# Patient Record
Sex: MALE | Race: WHITE
[De-identification: names, ages, dates, MRNs, and addresses within clinical notes are randomized per-mention and may not be internally consistent; named-entity substitution may affect disease eponyms.]

---

## 2017-06-16 ENCOUNTER — HOSPITAL ENCOUNTER (OUTPATIENT)
Dept: HOSPITAL 62 - LAB | Age: 77
End: 2017-06-16
Attending: INTERNAL MEDICINE
Payer: MEDICARE

## 2017-06-16 DIAGNOSIS — I12.9: Primary | ICD-10-CM

## 2017-06-16 DIAGNOSIS — N18.3: ICD-10-CM

## 2017-06-16 LAB
ANION GAP SERPL CALC-SCNC: 12 MMOL/L (ref 5–19)
BUN SERPL-MCNC: 23 MG/DL (ref 7–20)
CALCIUM: 9.7 MG/DL (ref 8.4–10.2)
CHLORIDE SERPL-SCNC: 107 MMOL/L (ref 98–107)
CO2 SERPL-SCNC: 23 MMOL/L (ref 22–30)
CREAT SERPL-MCNC: 1.38 MG/DL (ref 0.52–1.25)
GLUCOSE SERPL-MCNC: 103 MG/DL (ref 75–110)
POTASSIUM SERPL-SCNC: 4.3 MMOL/L (ref 3.6–5)
SODIUM SERPL-SCNC: 141.6 MMOL/L (ref 137–145)

## 2017-06-16 PROCEDURE — 80048 BASIC METABOLIC PNL TOTAL CA: CPT

## 2017-06-16 PROCEDURE — 36415 COLL VENOUS BLD VENIPUNCTURE: CPT

## 2017-09-07 ENCOUNTER — HOSPITAL ENCOUNTER (OUTPATIENT)
Dept: HOSPITAL 62 - LAB | Age: 77
End: 2017-09-07
Attending: SPECIALIST
Payer: MEDICARE

## 2017-09-07 DIAGNOSIS — R10.9: Primary | ICD-10-CM

## 2017-09-07 DIAGNOSIS — D50.9: ICD-10-CM

## 2017-09-07 LAB
BASOPHILS # BLD AUTO: 0.1 10^3/UL (ref 0–0.2)
BASOPHILS NFR BLD AUTO: 1.2 % (ref 0–2)
EOSINOPHIL # BLD AUTO: 0.4 10^3/UL (ref 0–0.6)
EOSINOPHIL NFR BLD AUTO: 5.9 % (ref 0–6)
ERYTHROCYTE [DISTWIDTH] IN BLOOD BY AUTOMATED COUNT: 12.9 % (ref 11.5–14)
FERRITIN SERPL-MCNC: 142 NG/ML (ref 17.9–464)
HCT VFR BLD CALC: 47.7 % (ref 37.9–51)
HGB BLD-MCNC: 15.9 G/DL (ref 13.5–17)
HGB HCT DIFFERENCE: 0
IRON SERPL-MCNC: 93.2 UG/DL (ref 49–181)
LYMPHOCYTES # BLD AUTO: 1.8 10^3/UL (ref 0.5–4.7)
LYMPHOCYTES NFR BLD AUTO: 24.6 % (ref 13–45)
MCH RBC QN AUTO: 32.5 PG (ref 27–33.4)
MCHC RBC AUTO-ENTMCNC: 33.3 G/DL (ref 32–36)
MCV RBC AUTO: 98 FL (ref 80–97)
MONOCYTES # BLD AUTO: 0.9 10^3/UL (ref 0.1–1.4)
MONOCYTES NFR BLD AUTO: 12.5 % (ref 3–13)
NEUTROPHILS # BLD AUTO: 4.1 10^3/UL (ref 1.7–8.2)
NEUTS SEG NFR BLD AUTO: 55.8 % (ref 42–78)
RBC # BLD AUTO: 4.89 10^6/UL (ref 4.35–5.55)
WBC # BLD AUTO: 7.4 10^3/UL (ref 4–10.5)

## 2017-09-07 PROCEDURE — 83540 ASSAY OF IRON: CPT

## 2017-09-07 PROCEDURE — 82728 ASSAY OF FERRITIN: CPT

## 2017-09-07 PROCEDURE — 36415 COLL VENOUS BLD VENIPUNCTURE: CPT

## 2017-09-07 PROCEDURE — 85025 COMPLETE CBC W/AUTO DIFF WBC: CPT

## 2017-09-21 NOTE — HISTORY AND PHYSICAL E
History and Physical



NAME: CORAL ABAD

MRN:  [id number]             : 1940   AGE: 77Y

ADMITTED: 2017                    ROOM:

 



CHIEF COMPLAINT:

Abdominal pain.



EXAM:

Upper endoscopy.



HISTORY OF PRESENT ILLNESS:

The patient does have history of colorectal polyps.  At this time he

presented with abdominal pain.  Patient for upper endoscopy.



I saw him .  His colon showed diverticulosis.



SOCIAL HISTORY:

He used to smoke, drinks socially, , 2 children.



ALLERGIES:

Negative.



REVIEW OF SYSTEMS:

RESPIRATORY:  COPD.



CARDIAC:  No chest pain.



ENDOCRINE:  Negative.



PHYSICAL EXAMINATION:



VITAL SIGNS:  Blood pressure 120/70, pulse 80, respirations 18,

temperature is 98.



HEAD, EYES, EARS, NOSE AND THROAT:  Normal.



ABDOMEN:  Soft.



NEUROLOGIC EXAM:  Negative.



PAST MEDICAL HISTORY:

The patient is known to have coronary artery disease.  He is on Plavix and

he takes baby aspirin.  Scope  shows descending colon polyp, ectasia

in the cecum.  The patient does have ectasia in the cecum.  The patient

was done .  Another colonoscopy done in the year .  Again,

diverticulosis, question AV malformation in the cecum, COPD, sleep apnea.



CONCLUSION:

Abdominal pain.



PLAN:

Upper scope scheduled for .  Stop Plavix and aspirin 3 days prior to

endoscopy regarding abdominal pain.







DICTATING PHYSICIAN: FADUMO KESSLER M.D.





[id number]                  DT: 2017    1511

PHY#: [id number]            DD: 2017    1436

ID:   [id number]           JOB#: [id number]       ACCT: [id number]



cc:FADUMO KESSLER M.D.

>

## 2017-09-27 ENCOUNTER — HOSPITAL ENCOUNTER (OUTPATIENT)
Dept: HOSPITAL 62 - END | Age: 77
Discharge: HOME | End: 2017-09-27
Attending: SPECIALIST
Payer: MEDICARE

## 2017-09-27 VITALS — SYSTOLIC BLOOD PRESSURE: 105 MMHG | DIASTOLIC BLOOD PRESSURE: 59 MMHG

## 2017-09-27 DIAGNOSIS — K20.9: ICD-10-CM

## 2017-09-27 DIAGNOSIS — Z79.02: ICD-10-CM

## 2017-09-27 DIAGNOSIS — J44.9: ICD-10-CM

## 2017-09-27 DIAGNOSIS — R10.9: Primary | ICD-10-CM

## 2017-09-27 DIAGNOSIS — K29.70: ICD-10-CM

## 2017-09-27 DIAGNOSIS — I25.10: ICD-10-CM

## 2017-09-27 DIAGNOSIS — Z79.82: ICD-10-CM

## 2017-09-27 DIAGNOSIS — K29.80: ICD-10-CM

## 2017-09-27 PROCEDURE — 43235 EGD DIAGNOSTIC BRUSH WASH: CPT

## 2017-09-27 PROCEDURE — 0DJD8ZZ INSPECTION OF LOWER INTESTINAL TRACT, VIA NATURAL OR ARTIFICIAL OPENING ENDOSCOPIC: ICD-10-PCS | Performed by: SPECIALIST

## 2017-09-27 RX ADMIN — MIDAZOLAM HYDROCHLORIDE ONE MG: 1 INJECTION, SOLUTION INTRAMUSCULAR; INTRAVENOUS at 09:10

## 2017-09-27 RX ADMIN — MIDAZOLAM HYDROCHLORIDE ONE MG: 1 INJECTION, SOLUTION INTRAMUSCULAR; INTRAVENOUS at 09:07

## 2017-09-27 NOTE — DISCHARGE SUMMARY E
Discharge Summary



NAME: CORAL ABAD

MRN:  [id number]        : 1940     AGE: 77Y

ADMITTED: 2017                 DISCHARGED: 2017



PROCEDURE:

EGD.



HISTORY:

This 77-year-old patient presented with dysphagia.  Patient is a cardiac

patient.  He has stents.  He takes Plavix and aspirin.  Today's upper

scope shows no definitive stricture, question lower esophageal ring,

question early narrowing, some erosions at the GE junction.  Because of

the cardiac risk, I did not feel the urge to dilate today.  I am going to

obtain a barium swallow and discharge him on a soft, baby food,

nonchewable diet.  If the barium swallow shows definite stricture I am

going to stop his Plavix for 5 days and aspirin 5 days.  There was some

friability at the junction just passing the scope.  For future endoscopy,

we will stop his Plavix for 5 days and aspirin 5 days and consider

dilatation if the barium swallow shows a stricture.





DICTATING PHYSICIAN:  FADUMO KESSLER M.D.





1209M                  DT: 2017    0936

Y#: [id number]            DD: [id number]

ID:   [id number]           JOB#: [id number]       ACCT: [id number]



cc:FADUMO KESSLER M.D.

>

## 2017-09-27 NOTE — OPERATIVE REPORT E
Operative Report



NAME: CORAL ABAD

MRN:  [id number]          : 1940 AGE:  77Y

DATE OF SURGERY: 2017               ROOM:



PREOPERATIVE DIAGNOSIS:

Dysphagia.



POSTOPERATIVE DIAGNOSES:

1.  Esophagitis, mild.

2.  Possibility of early stricture.

3.  Possibility of lower esophageal ring.

4.  Mild gastritis.

5.  Mild duodenitis.



PROCEDURE:

1.  Esophagoscopy.

2.  Gastroscopy.

3.  Duodenoscopy.



SURGEON:

FADUMO KESSLER M.D.



ANESTHESIA:

Versed 3 and fentanyl 50.



TISSUE REMOVED OR ALTERED:

None.



PROCEDURE:

Baby scope passed under guided vision.  No difficulties.  Esophagoscopy

junction at 40.  There is possibility of early lower esophageal ring. 

Possibility of early stricture.  Gastroscopy:  Mild gastritis. 

Duodenoscopy:  Mild duodenitis.



Patient is a cardiac patient and a stent.  We stopped his Plavix and

aspirin for 3 days.  I did not feel that the stricture is severe or

definite, so I am going to hold on dilatation today.  I am going to do

barium swallow and see if the stricture is definite.  We will bring him

back after we stop his Plavix 5 days and aspirin.  Consider dilatation at

that time if the stricture is definite on barium swallow.  By endoscopy,

the scope goes in and out with no difficulties and it may be early

stricture.  There was some erosions at the GE junction.  Patient to be

started on Dexilant 60 daily.  Patient to be discharged on soft,

non-chewable diet pending barium enema.



CONCLUSION:

1.  Question early esophageal stricture.

2.  Question lower esophageal ring.

3.  Mild esophagitis.

4.  Mild gastritis.

5.  Mild duodenitis.









DICTATING PHYSICIAN:  FADUMO KESSLER M.D.





[id number]                  DT: 201745

PHY#: [id number]            DD: 2017

ID:   [id number]           JOB#: [id number]       ACCT: [id number]



cc:FADUMO KESSLER M.D.

>

## 2017-09-28 ENCOUNTER — HOSPITAL ENCOUNTER (EMERGENCY)
Dept: HOSPITAL 62 - ER | Age: 77
Discharge: HOME | End: 2017-09-28
Payer: MEDICARE

## 2017-09-28 VITALS — DIASTOLIC BLOOD PRESSURE: 79 MMHG | SYSTOLIC BLOOD PRESSURE: 131 MMHG

## 2017-09-28 DIAGNOSIS — R11.2: ICD-10-CM

## 2017-09-28 DIAGNOSIS — R10.13: Primary | ICD-10-CM

## 2017-09-28 DIAGNOSIS — I25.2: ICD-10-CM

## 2017-09-28 DIAGNOSIS — Z87.891: ICD-10-CM

## 2017-09-28 DIAGNOSIS — I25.10: ICD-10-CM

## 2017-09-28 DIAGNOSIS — R19.7: ICD-10-CM

## 2017-09-28 LAB
ALBUMIN SERPL-MCNC: 4.4 G/DL (ref 3.5–5)
ALP SERPL-CCNC: 82 U/L (ref 38–126)
ALT SERPL-CCNC: 20 U/L (ref 21–72)
ANION GAP SERPL CALC-SCNC: 13 MMOL/L (ref 5–19)
APPEARANCE UR: CLEAR
AST SERPL-CCNC: 15 U/L (ref 17–59)
BASE EXCESS BLDV CALC-SCNC: -2.3 MMOL/L
BASOPHILS # BLD AUTO: 0 10^3/UL (ref 0–0.2)
BASOPHILS NFR BLD AUTO: 0.2 % (ref 0–2)
BILIRUB DIRECT SERPL-MCNC: 0.4 MG/DL (ref 0–0.4)
BILIRUB SERPL-MCNC: 0.7 MG/DL (ref 0.2–1.3)
BILIRUB UR QL STRIP: NEGATIVE
BUN SERPL-MCNC: 25 MG/DL (ref 7–20)
CALCIUM: 10.3 MG/DL (ref 8.4–10.2)
CHLORIDE SERPL-SCNC: 104 MMOL/L (ref 98–107)
CO2 SERPL-SCNC: 24 MMOL/L (ref 22–30)
CREAT SERPL-MCNC: 1.62 MG/DL (ref 0.52–1.25)
EOSINOPHIL # BLD AUTO: 0.1 10^3/UL (ref 0–0.6)
EOSINOPHIL NFR BLD AUTO: 0.4 % (ref 0–6)
ERYTHROCYTE [DISTWIDTH] IN BLOOD BY AUTOMATED COUNT: 13.1 % (ref 11.5–14)
GLUCOSE SERPL-MCNC: 146 MG/DL (ref 75–110)
GLUCOSE UR STRIP-MCNC: NEGATIVE MG/DL
HCO3 BLDV-SCNC: 22.7 MMOL/L (ref 20–32)
HCT VFR BLD CALC: 49.8 % (ref 37.9–51)
HGB BLD-MCNC: 17 G/DL (ref 13.5–17)
HGB HCT DIFFERENCE: 1.2
KETONES UR STRIP-MCNC: NEGATIVE MG/DL
LYMPHOCYTES # BLD AUTO: 0.9 10^3/UL (ref 0.5–4.7)
LYMPHOCYTES NFR BLD AUTO: 7.3 % (ref 13–45)
MCH RBC QN AUTO: 32.6 PG (ref 27–33.4)
MCHC RBC AUTO-ENTMCNC: 34.1 G/DL (ref 32–36)
MCV RBC AUTO: 96 FL (ref 80–97)
MONOCYTES # BLD AUTO: 1.2 10^3/UL (ref 0.1–1.4)
MONOCYTES NFR BLD AUTO: 9.7 % (ref 3–13)
NEUTROPHILS # BLD AUTO: 10.5 10^3/UL (ref 1.7–8.2)
NEUTS SEG NFR BLD AUTO: 82.4 % (ref 42–78)
NITRITE UR QL STRIP: NEGATIVE
PCO2 BLDV: 40 MMHG (ref 35–63)
PH BLDV: 7.37 [PH] (ref 7.3–7.42)
PH UR STRIP: 5 [PH] (ref 5–9)
POTASSIUM SERPL-SCNC: 4.7 MMOL/L (ref 3.6–5)
PROT SERPL-MCNC: 8 G/DL (ref 6.3–8.2)
PROT UR STRIP-MCNC: NEGATIVE MG/DL
PROTHROMBIN TIME: 13.4 SEC (ref 11.4–15.4)
RBC # BLD AUTO: 5.22 10^6/UL (ref 4.35–5.55)
SODIUM SERPL-SCNC: 141.4 MMOL/L (ref 137–145)
SP GR UR STRIP: 1.02
UROBILINOGEN UR-MCNC: NEGATIVE MG/DL (ref ?–2)
WBC # BLD AUTO: 12.8 10^3/UL (ref 4–10.5)

## 2017-09-28 PROCEDURE — 81001 URINALYSIS AUTO W/SCOPE: CPT

## 2017-09-28 PROCEDURE — 85025 COMPLETE CBC W/AUTO DIFF WBC: CPT

## 2017-09-28 PROCEDURE — 87040 BLOOD CULTURE FOR BACTERIA: CPT

## 2017-09-28 PROCEDURE — 82803 BLOOD GASES ANY COMBINATION: CPT

## 2017-09-28 PROCEDURE — 93010 ELECTROCARDIOGRAM REPORT: CPT

## 2017-09-28 PROCEDURE — 85610 PROTHROMBIN TIME: CPT

## 2017-09-28 PROCEDURE — 99284 EMERGENCY DEPT VISIT MOD MDM: CPT

## 2017-09-28 PROCEDURE — 74220 X-RAY XM ESOPHAGUS 1CNTRST: CPT

## 2017-09-28 PROCEDURE — 93005 ELECTROCARDIOGRAM TRACING: CPT

## 2017-09-28 PROCEDURE — 82962 GLUCOSE BLOOD TEST: CPT

## 2017-09-28 PROCEDURE — 87086 URINE CULTURE/COLONY COUNT: CPT

## 2017-09-28 PROCEDURE — 71020: CPT

## 2017-09-28 PROCEDURE — 83605 ASSAY OF LACTIC ACID: CPT

## 2017-09-28 PROCEDURE — 80053 COMPREHEN METABOLIC PANEL: CPT

## 2017-09-28 PROCEDURE — 36415 COLL VENOUS BLD VENIPUNCTURE: CPT

## 2017-09-28 NOTE — RADIOLOGY REPORT (SQ)
EXAM DESCRIPTION:  BARIUM SWALLOW ESOPHAGUS



COMPLETED DATE/TIME:  9/28/2017 10:05 am



REASON FOR STUDY:  post egd pain



COMPARISON:   None.



TECHNIQUE:  Under fluoroscopic guidance, patient ingested water-soluble contrast. Fluoroscopic spot i
mages and routine radiographic images acquired and stored on PACS.

12 MM BARIUM TABLET GIVEN: No



LIMITATIONS:  None.



FLUOROSCOPY TIME:  3 minutes

16 images saved to PACS.



FINDINGS:  NEUROMUSCULAR COORDINATION OF SWALLOW: Normal. No aspiration.

ESOPHAGEAL MOTILITY: Normal peristalsis. No esophageal spasm.

ESOPHAGEAL MUCOSA: Normal mucosa without masses or ulceration.

GASTRO-ESOPHAGEAL JUNCTION: No hiatal hernia or reflux.

NON-GI TRACT STRUCTURES: No significant finding.

OTHER: Note is made of pleural plaque at the bases of each lung.



IMPRESSION:  NORMAL SINGLE CONTRAST SWALLOW.  NO EVIDENCE FOR CONTRAST LEAK.



COMMENT:  None

Quality :  Final reports for procedures using fluoroscopy that document radiation exposure yissel
jacqueline, or exposure time and number of fluorographic images (if radiation exposure indices are not avail
able)



TECHNICAL DOCUMENTATION:  JOB ID:  [id number]

 2011 [employer]- All Rights Reserved

## 2017-09-28 NOTE — ER DOCUMENT REPORT
ED General





- General


Chief Complaint: Abdominal Pain


Stated Complaint: ABDOMINAL PAIN,VOMITING,DIARRHEA


Time Seen by Provider: 09/28/17 08:03


Mode of Arrival: Ambulatory


Information source: Patient


Notes: 





77-year-old male presents with complaints of epigastric abdominal pain after an 

EGD was performed.  Patient denies any fevers or chills admits to nausea 

vomiting passing gas


TRAVEL OUTSIDE OF THE [place] IN LAST 30 DAYS: No





- HPI


Onset: Just prior to arrival


Onset/Duration: Sudden


Quality of pain: Achy


Severity: Mild


Pain Level: 1


Associated symptoms: Nausea, Vomiting


Exacerbated by: Denies


Relieved by: Denies


Similar symptoms previously: Yes


Recently seen / treated by doctor: Yes





- Related Data


Allergies/Adverse Reactions: 


 





No Known Allergies Allergy (Verified 09/28/17 07:56)


 








Home Medications: 


 Current Home Medications





Atorvastatin Calcium [Lipitor 40 mg Tablet] 40 mg PO QHS 09/28/17 [History]











Past Medical History





- Social History


Smoking Status: Former Smoker


Cigarette use (# per day): No


Chew tobacco use (# tins/day): No


Smoking Education Provided: No


Frequency of alcohol use: None


Drug Abuse: None


Family History: Reviewed & Not Pertinent





- Past Medical History


Cardiac Medical History: Reports: Hx Coronary Artery Disease - CARDIAC STENT X 2

/ RAPID HEART RATE, Hx Heart Attack - 8/16  2 STENTS PLACED, Hx 

Hypercholesterolemia, Hx Hypertension


Pulmonary Medical History: Reports: Hx COPD


   Denies: Hx Asthma, Hx Bronchitis, Hx Pneumonia


Neurological Medical History: Denies: Hx Cerebrovascular Accident, Hx Seizures


Renal/ Medical History: Denies: Hx Peritoneal Dialysis


GI Medical History: Denies: Hx Hepatitis, Hx Hiatal Hernia, Hx Ulcer


Musculoskeltal Medical History: Reports Hx Arthritis - generalized


Infectious Medical History: Denies: Hx Hepatitis


Past Surgical History: Reports: Hx Abdominal Surgery.  Denies: Hx Open Heart 

Surgery, Hx Pacemaker





- Immunizations


Hx Diphtheria, Pertussis, Tetanus Vaccination: No


Hx Pneumococcal Vaccination: 11/01/12





Review of Systems





- Review of Systems


Notes: 





REVIEW OF SYSTEMS:


CONSTITUTIONAL :  Denies fever,  chills, or sweats.  Denies recent illness.


EENT:   Denies eye, ear, throat, or mouth pain or symptoms.  Denies nasal or 

sinus congestion or discharge.  Denies throat, tongue, or mouth swelling or 

difficulty swallowing.


CARDIOVASCULAR:  Denies chest pain.  Denies palpitations or racing or irregular 

heart beat.  Denies ankle edema.


RESPIRATORY:  Denies cough, cold, or chest congestion.  Denies shortness of 

breath, difficulty breathing, or wheezing.


GASTROINTESTINAL: Admits to abdominal pain nausea vomiting


GENITOURINARY:  Denies difficulty urinating, painful urination, burning, 

frequency, blood in urine, or discharge.


MUSCULOSKELETAL:  Denies back or neck pain or stiffness.  Denies joint pain or 

swelling.


SKIN:   Denies rash, lesions or sores.


HEMATOLOGIC :   Denies easy bruising or bleeding.


LYMPHATIC:  Denies swollen, enlarged glands.


NEUROLOGICAL:  Denies confusion or altered mental status.  Denies passing out 

or loss of consciousness.  Denies dizziness or lightheadedness.  Denies 

headache.  Denies weakness or paralysis or loss of use of either side.  Denies 

problems with gait or speech.  Denies sensory loss, numbness, or tingling.  

Denies seizures.


PSYCHIATRIC:  Denies anxiety or stress.  Denies depression, suicidal ideation, 

or homicidal ideation.





ALL OTHER SYSTEMS REVIEWED AND NEGATIVE.





Dictation was performed using Dragon voice recognition software 





PHYSICAL EXAMINATION:





GENERAL: Well-appearing, well-nourished and in no acute distress.





HEAD: Atraumatic, normocephalic.





EYES: Pupils equal round and reactive to light, extraocular movements intact, 

sclera anicteric, conjunctiva are normal.





ENT: Nares patent, oropharynx clear without exudates.  Moist mucous membranes.





NECK: Normal range of motion, supple without lymphadenopathy





LUNGS: Breath sounds clear to auscultation bilaterally and equal.  No wheezes 

rales or rhonchi.





HEART: Regular rate and rhythm without murmurs





ABDOMEN: Soft, minimally tender in the epigastric region





Musculoskeletal: Normal range of motion, no pitting or edema.  No cyanosis.





NEUROLOGICAL: Cranial nerves grossly intact.  Normal speech, normal gait.  

Normal sensory, motor exams 





PSYCH: Normal mood, normal affect.





SKIN: Warm, Dry, normal turgor, no rashes or lesions noted.





Physical Exam





- Vital signs


Vitals: 


 











Temp Pulse Resp BP Pulse Ox


 


 97.7 F   119 H  20   127/68 H  94 


 


 09/28/17 07:56  09/28/17 07:56  09/28/17 07:56  09/28/17 07:56  09/28/17 07:56














Course





- Re-evaluation


Re-evalutation: 





09/28/17 15:43


Concern was for an esophageal rupture secondary to the EGD, patient's x-ray was 

negative a barium swallow was performed which noted no leakage of contrast.  

Patient's GI specialist Dr. Cole presented to the ED and evaluated the patient 

as well and was appreciative of the care





Patient was discharged home with very close follow-up he is very happy with 

this plan








After performing a Medical Screening Examination, I estimate there is LOW risk 

for ACUTE APPENDICITIS, BOWEL OBSTRUCTION, ACUTE CHOLECYSTITIS, PERFORATED 

DIVERTICULITIS, INCARCERATED HERNIA, PANCREATITIS, or PERFORATED ULCER, thus I 

consider the discharge disposition reasonable. Also, there is no evidence or 

peritonitis, sepsis, or toxicity.  I have reevaluated this patient multiple 

times and no significant life threatening changes are noted. The patient and I 

have discussed the diagnosis and risks, and we agree with discharging home with 

close follow-up with the understanding that symptoms and presentations can 

change. We also discussed returning to the Emergency Department immediately if 

new or worsening symptoms occur. We have discussed the symptoms which are most 

concerning (e.g., bloody stool, fever, changing or worsening pain, intractable 

vomiting - standard verbal up date) that necessitate immediate return.





- Vital Signs


Vital signs: 


 











Temp Pulse Resp BP Pulse Ox


 


 98.4 F   92   20   131/79 H  95 


 


 09/28/17 11:42  09/28/17 11:42  09/28/17 07:56  09/28/17 11:42  09/28/17 11:42














- Laboratory


Result Diagrams: 


 09/28/17 08:25





 09/28/17 08:25


Laboratory results interpreted by me: 


 











  09/28/17 09/28/17 09/28/17





  08:25 08:25 08:25


 


WBC  12.8 H  


 


Seg Neutrophils %  82.4 H  


 


Lymphocytes %  7.3 L  


 


Absolute Neutrophils  10.5 H  


 


BUN   25 H 


 


Creatinine   1.62 H 


 


Est GFR ( Amer)   50 L 


 


Est GFR (Non-Af Amer)   41 L 


 


Glucose   146 H 


 


POC Glucose   


 


Lactic Acid    2.4 H


 


Calcium   10.3 H 


 


AST   15 L 


 


ALT   20 L 














  09/28/17





  08:33


 


WBC 


 


Seg Neutrophils % 


 


Lymphocytes % 


 


Absolute Neutrophils 


 


BUN 


 


Creatinine 


 


Est GFR ( Amer) 


 


Est GFR (Non-Af Amer) 


 


Glucose 


 


POC Glucose  132 H


 


Lactic Acid 


 


Calcium 


 


AST 


 


ALT 














- Diagnostic Test


Radiology reviewed: Image reviewed, Reports reviewed - No acute abnormality





Discharge





- Discharge


Clinical Impression: 


Abdominal pain


Qualifiers:


 Abdominal location: epigastric Qualified Code(s): R10.13 - Epigastric pain





Nausea & vomiting


Qualifiers:


 Vomiting type: unspecified Vomiting Intractability: non-intractable Qualified 

Code(s): R11.2 - Nausea with vomiting, unspecified





Condition: Stable


Disposition: HOME, SELF-CARE


Instructions:  Abdominal Pain ([facility])


Prescriptions: 


Ondansetron HCl [Zofran 8 mg Tablet] 8 mg PO Q8HP PRN #30 tablet


 PRN Reason: 


Famotidine [Pepcid 20 mg Tablet] 20 mg PO DAILY #30 tablet


Referrals: 


RAVEN PATEL MD [Primary Care Provider] - Follow up as needed


FADUMO COLE MD [EMERITUS] - Follow up tomorrow

## 2017-09-28 NOTE — EKG REPORT
SEVERITY:- ABNORMAL ECG -

SINUS TACHYCARDIA

VENTRICULAR BIGEMINY

BORDERLINE LEFT AXIS DEVIATION

:

Confirmed by: Diann Coreas MD 28-Sep-2017 10:03:00

## 2017-09-28 NOTE — RADIOLOGY REPORT (SQ)
EXAM DESCRIPTION:  CHEST PA/LAT



COMPLETED DATE/TIME:  9/28/2017 8:44 am



REASON FOR STUDY:  chest pain abd pain post egd



COMPARISON:  Chest x-ray dated 8/10/2016 and 9/18/2012.  Chest CT dated 1/16/2013.



EXAM PARAMETERS:  NUMBER OF VIEWS: two views

TECHNIQUE: Digital Frontal and Lateral radiographic views of the chest acquired.

RADIATION DOSE: NA

LIMITATIONS: none



FINDINGS:  LUNGS AND PLEURA: Prominent interstitial densities.  Scattered calcified pleural plaques. 
 Density in the right apex with pleural thickening.  No definite focal infiltrates.  No large pleural
 effusion.  No pneumothorax.

MEDIASTINUM AND HILAR STRUCTURES: No masses or contour abnormalities.

HEART AND VASCULAR STRUCTURES: Heart normal size.  No evidence for failure.

BONES: No acute findings.

HARDWARE: None in the chest.

OTHER: No other significant finding.



IMPRESSION:  STABLE CHRONIC CHANGES INCLUDING PARENCHYMAL SCARRING AND CALCIFIED PLEURAL PLAQUES.  DE
NSITY IN THE RIGHT APEX WITH ADJACENT PLEURAL THICKENING IS PRESENT ON PRIOR STUDIES INCLUDING CHEST 
CT AND THERE HAS BEEN NO CHANGE SINCE 2012.  NO DEFINITE ACUTE FINDINGS.



TECHNICAL DOCUMENTATION:  JOB ID:  [id number]

 [employer]- All Rights Reserved

## 2017-12-11 ENCOUNTER — HOSPITAL ENCOUNTER (OUTPATIENT)
Dept: HOSPITAL 62 - LAB | Age: 77
End: 2017-12-11
Attending: INTERNAL MEDICINE
Payer: MEDICARE

## 2017-12-11 DIAGNOSIS — N18.3: Primary | ICD-10-CM

## 2017-12-11 DIAGNOSIS — I12.9: ICD-10-CM

## 2017-12-11 LAB
ANION GAP SERPL CALC-SCNC: 13 MMOL/L (ref 5–19)
BUN SERPL-MCNC: 23 MG/DL (ref 7–20)
CALCIUM: 9.5 MG/DL (ref 8.4–10.2)
CHLORIDE SERPL-SCNC: 106 MMOL/L (ref 98–107)
CO2 SERPL-SCNC: 26 MMOL/L (ref 22–30)
CREAT SERPL-MCNC: 1.43 MG/DL (ref 0.52–1.25)
ERYTHROCYTE [DISTWIDTH] IN BLOOD BY AUTOMATED COUNT: 13.8 % (ref 11.5–14)
GLUCOSE SERPL-MCNC: 100 MG/DL (ref 75–110)
HCT VFR BLD CALC: 46.3 % (ref 37.9–51)
HGB BLD-MCNC: 15.8 G/DL (ref 13.5–17)
HGB HCT DIFFERENCE: 1.1
MCH RBC QN AUTO: 32.8 PG (ref 27–33.4)
MCHC RBC AUTO-ENTMCNC: 34.2 G/DL (ref 32–36)
MCV RBC AUTO: 96 FL (ref 80–97)
POTASSIUM SERPL-SCNC: 4.6 MMOL/L (ref 3.6–5)
RBC # BLD AUTO: 4.82 10^6/UL (ref 4.35–5.55)
SODIUM SERPL-SCNC: 145.1 MMOL/L (ref 137–145)
WBC # BLD AUTO: 7.4 10^3/UL (ref 4–10.5)

## 2017-12-11 PROCEDURE — 36415 COLL VENOUS BLD VENIPUNCTURE: CPT

## 2017-12-11 PROCEDURE — 80048 BASIC METABOLIC PNL TOTAL CA: CPT

## 2017-12-11 PROCEDURE — 85027 COMPLETE CBC AUTOMATED: CPT

## 2018-06-25 ENCOUNTER — HOSPITAL ENCOUNTER (OUTPATIENT)
Dept: HOSPITAL 62 - LAB | Age: 78
End: 2018-06-25
Attending: INTERNAL MEDICINE
Payer: MEDICARE

## 2018-06-25 DIAGNOSIS — N18.4: ICD-10-CM

## 2018-06-25 DIAGNOSIS — I12.9: Primary | ICD-10-CM

## 2018-06-25 LAB
ANION GAP SERPL CALC-SCNC: 8 MMOL/L (ref 5–19)
BUN SERPL-MCNC: 24 MG/DL (ref 7–20)
CALCIUM: 9.6 MG/DL (ref 8.4–10.2)
CHLORIDE SERPL-SCNC: 108 MMOL/L (ref 98–107)
CO2 SERPL-SCNC: 29 MMOL/L (ref 22–30)
ERYTHROCYTE [DISTWIDTH] IN BLOOD BY AUTOMATED COUNT: 13.5 % (ref 11.5–14)
GLUCOSE SERPL-MCNC: 103 MG/DL (ref 75–110)
HCT VFR BLD CALC: 45.5 % (ref 37.9–51)
HGB BLD-MCNC: 15.5 G/DL (ref 13.5–17)
MCH RBC QN AUTO: 32.7 PG (ref 27–33.4)
MCHC RBC AUTO-ENTMCNC: 34 G/DL (ref 32–36)
MCV RBC AUTO: 96 FL (ref 80–97)
PLATELET # BLD: 212 10^3/UL (ref 150–450)
POTASSIUM SERPL-SCNC: 4.7 MMOL/L (ref 3.6–5)
RBC # BLD AUTO: 4.74 10^6/UL (ref 4.35–5.55)
SODIUM SERPL-SCNC: 145.4 MMOL/L (ref 137–145)
WBC # BLD AUTO: 6.9 10^3/UL (ref 4–10.5)

## 2018-06-25 PROCEDURE — 36415 COLL VENOUS BLD VENIPUNCTURE: CPT

## 2018-06-25 PROCEDURE — 80048 BASIC METABOLIC PNL TOTAL CA: CPT

## 2018-06-25 PROCEDURE — 85027 COMPLETE CBC AUTOMATED: CPT

## 2019-01-04 ENCOUNTER — HOSPITAL ENCOUNTER (OUTPATIENT)
Dept: HOSPITAL 62 - LAB | Age: 79
End: 2019-01-04
Attending: INTERNAL MEDICINE
Payer: MEDICARE

## 2019-01-04 DIAGNOSIS — N18.4: ICD-10-CM

## 2019-01-04 DIAGNOSIS — I12.9: Primary | ICD-10-CM

## 2019-01-04 LAB
ANION GAP SERPL CALC-SCNC: 9 MMOL/L
APPEARANCE UR: CLEAR
APTT PPP: YELLOW S
BILIRUB UR QL STRIP: NEGATIVE
BUN SERPL-MCNC: 22 MG/DL
CALCIUM: 9.9 MG/DL
CHLORIDE SERPL-SCNC: 106 MMOL/L
CO2 SERPL-SCNC: 27 MMOL/L
ERYTHROCYTE [DISTWIDTH] IN BLOOD BY AUTOMATED COUNT: 12.7 %
GLUCOSE SERPL-MCNC: 107 MG/DL
GLUCOSE UR STRIP-MCNC: NEGATIVE MG/DL
HCT VFR BLD CALC: 48.4 %
HGB BLD-MCNC: 16.9 G/DL
KETONES UR STRIP-MCNC: NEGATIVE MG/DL
MCH RBC QN AUTO: 33.2 PG
MCHC RBC AUTO-ENTMCNC: 34.8 G/DL
MCV RBC AUTO: 95 FL
NITRITE UR QL STRIP: NEGATIVE
PH UR STRIP: 5 [PH]
PLATELET # BLD: 204 10^3/UL
POTASSIUM SERPL-SCNC: 4.6 MMOL/L
PROT UR STRIP-MCNC: NEGATIVE MG/DL
RBC # BLD AUTO: 5.08 10^6/UL
SODIUM SERPL-SCNC: 142.2 MMOL/L
SP GR UR STRIP: 1.02
UROBILINOGEN UR-MCNC: NEGATIVE MG/DL
WBC # BLD AUTO: 5.4 10^3/UL

## 2019-01-04 PROCEDURE — 36415 COLL VENOUS BLD VENIPUNCTURE: CPT

## 2019-01-04 PROCEDURE — 80048 BASIC METABOLIC PNL TOTAL CA: CPT

## 2019-01-04 PROCEDURE — 85027 COMPLETE CBC AUTOMATED: CPT

## 2019-01-04 PROCEDURE — 81001 URINALYSIS AUTO W/SCOPE: CPT

## 2019-07-05 ENCOUNTER — HOSPITAL ENCOUNTER (OUTPATIENT)
Dept: HOSPITAL 62 - LAB | Age: 79
End: 2019-07-05
Attending: INTERNAL MEDICINE
Payer: MEDICARE

## 2019-07-05 DIAGNOSIS — I12.9: Primary | ICD-10-CM

## 2019-07-05 DIAGNOSIS — N18.3: ICD-10-CM

## 2019-07-05 LAB
ANION GAP SERPL CALC-SCNC: 7 MMOL/L (ref 5–19)
BUN SERPL-MCNC: 22 MG/DL (ref 7–20)
CALCIUM: 10 MG/DL (ref 8.4–10.2)
CHLORIDE SERPL-SCNC: 104 MMOL/L (ref 98–107)
CO2 SERPL-SCNC: 27 MMOL/L (ref 22–30)
ERYTHROCYTE [DISTWIDTH] IN BLOOD BY AUTOMATED COUNT: 13.6 % (ref 11.5–14)
GLUCOSE SERPL-MCNC: 102 MG/DL (ref 75–110)
HCT VFR BLD CALC: 46.1 % (ref 37.9–51)
HGB BLD-MCNC: 15.7 G/DL (ref 13.5–17)
MCH RBC QN AUTO: 32.4 PG (ref 27–33.4)
MCHC RBC AUTO-ENTMCNC: 34 G/DL (ref 32–36)
MCV RBC AUTO: 95 FL (ref 80–97)
PLATELET # BLD: 202 10^3/UL (ref 150–450)
POTASSIUM SERPL-SCNC: 4.7 MMOL/L (ref 3.6–5)
RBC # BLD AUTO: 4.85 10^6/UL (ref 4.35–5.55)
SODIUM SERPL-SCNC: 137.7 MMOL/L (ref 137–145)
WBC # BLD AUTO: 8.9 10^3/UL (ref 4–10.5)

## 2019-07-05 PROCEDURE — 80048 BASIC METABOLIC PNL TOTAL CA: CPT

## 2019-07-05 PROCEDURE — 85027 COMPLETE CBC AUTOMATED: CPT

## 2019-07-05 PROCEDURE — 36415 COLL VENOUS BLD VENIPUNCTURE: CPT

## 2020-07-16 ENCOUNTER — HOSPITAL ENCOUNTER (EMERGENCY)
Dept: HOSPITAL 62 - ER | Age: 80
Discharge: HOME | End: 2020-07-16
Payer: MEDICARE

## 2020-07-16 VITALS — SYSTOLIC BLOOD PRESSURE: 131 MMHG | DIASTOLIC BLOOD PRESSURE: 114 MMHG

## 2020-07-16 DIAGNOSIS — I25.10: ICD-10-CM

## 2020-07-16 DIAGNOSIS — U07.1: Primary | ICD-10-CM

## 2020-07-16 DIAGNOSIS — E78.00: ICD-10-CM

## 2020-07-16 DIAGNOSIS — Z20.828: ICD-10-CM

## 2020-07-16 DIAGNOSIS — J40: ICD-10-CM

## 2020-07-16 DIAGNOSIS — I10: ICD-10-CM

## 2020-07-16 DIAGNOSIS — R55: ICD-10-CM

## 2020-07-16 LAB
ABSOLUTE LYMPHOCYTES# (MANUAL): 0.1 10^3/UL (ref 0.5–4.7)
ABSOLUTE MONOCYTES # (MANUAL): 1.2 10^3/UL (ref 0.1–1.4)
ADD MANUAL DIFF: YES
ALBUMIN SERPL-MCNC: 3.7 G/DL (ref 3.5–5)
ALP SERPL-CCNC: 72 U/L (ref 38–126)
ANION GAP SERPL CALC-SCNC: 7 MMOL/L (ref 5–19)
APPEARANCE UR: CLEAR
APTT PPP: YELLOW S
AST SERPL-CCNC: 28 U/L (ref 17–59)
BASOPHILS NFR BLD MANUAL: 1 % (ref 0–2)
BILIRUB DIRECT SERPL-MCNC: 0 MG/DL (ref 0–0.4)
BILIRUB SERPL-MCNC: 0.5 MG/DL (ref 0.2–1.3)
BILIRUB UR QL STRIP: NEGATIVE
BUN SERPL-MCNC: 22 MG/DL (ref 7–20)
CALCIUM: 8.9 MG/DL (ref 8.4–10.2)
CHLORIDE SERPL-SCNC: 100 MMOL/L (ref 98–107)
CK SERPL-CCNC: 186 U/L (ref 55–170)
CO2 SERPL-SCNC: 27 MMOL/L (ref 22–30)
EOSINOPHIL NFR BLD MANUAL: 3 % (ref 0–6)
ERYTHROCYTE [DISTWIDTH] IN BLOOD BY AUTOMATED COUNT: 13.5 % (ref 11.5–14)
GLUCOSE SERPL-MCNC: 107 MG/DL (ref 75–110)
GLUCOSE UR STRIP-MCNC: NEGATIVE MG/DL
HCT VFR BLD CALC: 44.5 % (ref 37.9–51)
HGB BLD-MCNC: 15.3 G/DL (ref 13.5–17)
KETONES UR STRIP-MCNC: NEGATIVE MG/DL
MCH RBC QN AUTO: 33.3 PG (ref 27–33.4)
MCHC RBC AUTO-ENTMCNC: 34.3 G/DL (ref 32–36)
MCV RBC AUTO: 97 FL (ref 80–97)
MONOCYTES % (MANUAL): 19 % (ref 3–13)
NEUTS BAND NFR BLD MANUAL: 2 % (ref 3–5)
NITRITE UR QL STRIP: NEGATIVE
PH UR STRIP: 5 [PH] (ref 5–9)
PLATELET # BLD: 141 10^3/UL (ref 150–450)
PLATELET COMMENT: (no result)
POTASSIUM SERPL-SCNC: 4.4 MMOL/L (ref 3.6–5)
PROT SERPL-MCNC: 7.1 G/DL (ref 6.3–8.2)
PROT UR STRIP-MCNC: NEGATIVE MG/DL
RBC # BLD AUTO: 4.59 10^6/UL (ref 4.35–5.55)
RBC MORPH BLD: (no result)
SEGMENTED NEUTROPHILS % (MAN): 73 % (ref 42–78)
SP GR UR STRIP: 1.01
TOTAL CELLS COUNTED BLD: 100
UROBILINOGEN UR-MCNC: NEGATIVE MG/DL (ref ?–2)
VARIANT LYMPHS NFR BLD MANUAL: 2 % (ref 13–45)
WBC # BLD AUTO: 6.1 10^3/UL (ref 4–10.5)

## 2020-07-16 PROCEDURE — 96361 HYDRATE IV INFUSION ADD-ON: CPT

## 2020-07-16 PROCEDURE — 99284 EMERGENCY DEPT VISIT MOD MDM: CPT

## 2020-07-16 PROCEDURE — 87077 CULTURE AEROBIC IDENTIFY: CPT

## 2020-07-16 PROCEDURE — 84484 ASSAY OF TROPONIN QUANT: CPT

## 2020-07-16 PROCEDURE — 87635 SARS-COV-2 COVID-19 AMP PRB: CPT

## 2020-07-16 PROCEDURE — 71045 X-RAY EXAM CHEST 1 VIEW: CPT

## 2020-07-16 PROCEDURE — 96360 HYDRATION IV INFUSION INIT: CPT

## 2020-07-16 PROCEDURE — 36415 COLL VENOUS BLD VENIPUNCTURE: CPT

## 2020-07-16 PROCEDURE — 87040 BLOOD CULTURE FOR BACTERIA: CPT

## 2020-07-16 PROCEDURE — 93005 ELECTROCARDIOGRAM TRACING: CPT

## 2020-07-16 PROCEDURE — 85025 COMPLETE CBC W/AUTO DIFF WBC: CPT

## 2020-07-16 PROCEDURE — 93010 ELECTROCARDIOGRAM REPORT: CPT

## 2020-07-16 PROCEDURE — 81001 URINALYSIS AUTO W/SCOPE: CPT

## 2020-07-16 PROCEDURE — 87150 DNA/RNA AMPLIFIED PROBE: CPT

## 2020-07-16 PROCEDURE — 82550 ASSAY OF CK (CPK): CPT

## 2020-07-16 PROCEDURE — 87186 SC STD MICRODIL/AGAR DIL: CPT

## 2020-07-16 PROCEDURE — C9803 HOPD COVID-19 SPEC COLLECT: HCPCS

## 2020-07-16 PROCEDURE — 80053 COMPREHEN METABOLIC PANEL: CPT

## 2020-07-16 NOTE — EKG REPORT
SEVERITY:- ABNORMAL ECG -

SINUS RHYTHM

PROLONGED QT INTERVAL

:

Confirmed by: Marcellus Diehl MD 16-Jul-2020 13:24:16

## 2020-07-16 NOTE — ER DOCUMENT REPORT
Entered by SHANICE CARRANZA SCRIBE  07/16/20 0732 





Acting as scribe for:DERRICK CHARLES MD





ED Syncope and Near Syncope





- General


Chief Complaint: Fall


Stated Complaint: FALL


Time Seen by Provider: 07/16/20 07:13


Primary Care Provider: 


EDZ CHIN MD [ACTIVE STAFF] - Follow up as needed


Mode of Arrival: Ambulatory


Information source: Patient


Notes: 





This 80 year old male patient presents to the emergency department today with 

complaints of syncope. Patient reports that the last two nights he was standing 

up to urinate when he passed out. Wife reported that today it "took her longer 

to wake him up" than it did yesterday after his syncopal event.





Patient mentions that he has not had an appetite for the last few days he has 

had some nasal congestion and a mild cough.  Patient states he has been taking 

Tylenol chest/cold for this and it seems to have been improving.  Patient denies

a history of BPH.





The patient reports that he had been tested for COVID at the beginning of this 

month.  [place] is [place] beach with [place] to include his son 

who is involved with high school football practice and [facility] where 

there had been some positive tests on that football team.  His son had been 

tested but is still not gotten results back.





TRAVEL OUTSIDE OF THE [place] IN LAST 30 DAYS: No





- Related Data


Allergies/Adverse Reactions: 


                                        





No Known Allergies Allergy (Verified 07/16/20 07:23)


   








Home Medications: Metoprolol





Past Medical History





- General


Information source: Patient





- Social History


Smoking Status: Former Smoker


Cigarette use (# per day): No


Chew tobacco use (# tins/day): No


Frequency of alcohol use: Occasional


Drug Abuse: None


Lives with: Family


Family History: Reviewed & Not Pertinent





- Past Medical History


Cardiac Medical History: Reports: Hx Coronary Artery Disease - CARDIAC STENT X 

2/ RAPID HEART RATE, Hx Heart Attack - 8/16  2 STENTS PLACED, Hx 

Hypercholesterolemia, Hx Hypertension


Pulmonary Medical History: Reports: Hx COPD


Musculoskeletal Medical History: Reports Hx Arthritis - generalized


Past Surgical History: Reports: Hx Abdominal Surgery





- Immunizations


Hx Diphtheria, Pertussis, Tetanus Vaccination: No


Hx Pneumococcal Vaccination: 11/01/12





Review of Systems





- Review of Systems


Constitutional: See HPI, Other - no appetite


EENT: See HPI, Nose congestion


Cardiovascular: See HPI, Syncope


Respiratory: See HPI, Cough


Gastrointestinal: No symptoms reported


Genitourinary: No symptoms reported


Male Genitourinary: No symptoms reported


Musculoskeletal: No symptoms reported


Skin: No symptoms reported


Hematologic/Lymphatic: No symptoms reported


Neurological/Psychological: No symptoms reported


-: Yes All other systems reviewed and negative





Physical Exam





- Vital signs


Vitals: 


                                        











Resp Pulse Ox


 


 16   93 


 


 07/16/20 03:36  07/16/20 03:36














- Notes


Notes: 





Physical Exam:


 


General: Alert, appears well. 


 


HEENT: Nasal sinus congestion. Normocephalic. Atraumatic. PERRL. Extraocular 

movements intact. Oropharynx clear.


 


Neck: Supple. Non-tender.


 


Respiratory: No respiratory distress.  Rhonchi with forced cough bilaterally.


 


Cardiovascular: Regular rate and rhythm. 


 


Abdominal: Normal Inspection. Non-tender. No distension. Normal Bowel Sounds. 


 


Back: No gross abnormalities. 


 


Extremities: Moves all four extremities.


Upper extremities: Normal inspection. Normal ROM.  


Lower extremities: Normal inspection. No edema. Normal ROM.


 


Neurological: Normal cognition. AAOx4. Normal speech.  


 


Psychological: Normal affect. Normal Mood. 


 


Skin: Warm. Dry. Normal color.





Course





- Re-evaluation


Re-evalutation: 





07/16/20 11:25


CBC, Chem-12, cardiac enzymes, urinalysis,





- Vital Signs


Vital signs: 


                                        











Temp Pulse Resp BP Pulse Ox


 


 98.8 F      17   146/57 H  93 


 


 07/16/20 07:33     07/16/20 13:01  07/16/20 13:01  07/16/20 13:01














- Laboratory


Result Diagrams: 


                                 07/16/20 04:45





                                 07/16/20 04:45


Laboratory results interpreted by me: 


                                        











  07/16/20 07/16/20 07/16/20





  04:45 04:45 10:50


 


Plt Count  141 L  


 


Band Neutrophils %  2 L  


 


Lymphocytes % (Manual)  2 L  


 


Monocytes % (Manual)  19 H  


 


Abs Lymphs (Manual)  0.1 L  


 


Sodium   133.8 L 


 


BUN   22 H 


 


Creatinine   1.70 H 


 


Est GFR ( Amer)   47 L 


 


Est GFR (MDRD) Non-Af   39 L 


 


Creatine Kinase   186 H 


 


Urine Blood    SMALL H














- Diagnostic Test


Radiology reviewed: Image reviewed, Reports reviewed - Chronic pleural and 

parenchymal changes.





- EKG Interpretation by Me


EKG shows normal: Sinus rhythm, Axis, Intervals, QRS Complexes, ST-T Waves


Rate: Normal


Rhythm: NSR


When compared to previous EKG there are: Other - The first EKG that was done was

poor quality, but did suggest lateral injury.  The initial troponin was 

undetectable.  The repeat EKG about 8 hours later is recorded as above, and it 

is completely normal.





Discharge





- Discharge


Clinical Impression: 


 Micturition syncope, Bronchitis, Encounter for laboratory testing for COVID-19 

virus





Condition: Stable


Disposition: HOME, SELF-CARE


Additional Instructions: 


Syncopal Episode





     Syncope (fainting or near-fainting) can occur from many different health 

problems.  Or it can be a simple fainting spell requiring no treatment.  It is 

safe for you to go home, but further evaluation will likely be necessary.


     Your work-up may include tests for internal bleeding, heart disease, 

medication problems, or near-strokes.  Tests are not always required, however, 

depending on the nature of your problem.


     The warning signs of an impending faint include: dizziness, 

lightheadedness, nausea, hot flashes, tingling, and weakness.  If this happens, 

lay down and put your feet up, then wait until all of these symptoms have passed

before standing up again.


     If these episodes become recurrent, or if you develop chest pain, heart 

palpitations, mental confusion, blurred vision, or headache, then you should 

call the physician, or go to the emergency room.








*******************************************************************

******************************************************





The 2 episodes of blacking out your report both occurred while you are standing 

up to urinate during the night.


This is called micturition syncope, it is passing out due to a drop in blood 

pressure while you may be standing's upright and straining to urinate.





You should try to make a conscious effort to sit down on the toilet when you 

have to urinate in the evening to prevent further blackout episodes.


You should also drink plenty of fluids throughout the day in the evening so that

you remain well-hydrated and do not risk having your blood pressure fall when 

you stand up at night.








You were tested for the COVID-19 virus today, results are usually back in about 

3 days.


You should self isolate until you get the results of the testing.





Dr. [facility] will have his office staff call you to 

schedule a time tomorrow to come into their office and have the cardiac monitor 

put on.








RETURN TO THE EMERGENCY ROOM IF ANY NEW OR WORSENING SYMPTOMS.




















You are being tested for the virus that causes coronavirus disease 2019 (COVID-

19).  Public health actions are necessary to ensure protection of your health 

and the health of others, and to prevent further spread of infection.  COVID-19 

is caused by a virus that can cause symptoms, such as fever, cough, and 

shortness of breath.  The primary transmission from person to person is by 

coughing or sneezing.  On January 30, 2020, the World Health Organization an

nounced the public health emergency of international concern and on January 31, 2020 the [place] Department of Health and Human Services declared a public health 

emergency.  If the virus that causes COVID-19 spreads in the community, it could

have severe public health consequences.





As a person under investigation for COVID-19, the [place] Department of 

Health and Human Services, division of public health advise you to adhere to the

following guidance until your test results are reported to you.  If your test 

result is positive, you will receive additional information from your provider 

and your local health department at that time.





Remain at home until your provider or public health officials inform you that 

your test was negative or until all of the following criteria are met 1) At le

ast 72 hours have passed since recovery defined as resolution of fever without 

the use of fever-reducing medications and improvement in respiratory symptoms 

(e.g. cough, shortness of breath) 2) at least 7 days have passed since your 

symptoms first appeared.





Keep a log of visitors to your home using the form provided.  Notify any 

visitors to your home of your isolation status.





If you plan to move to a new address or leave the county, notify the local 

health department in your county.





Call a doctor or seek care if you have an urgent medical need.  Before seeking 

medical care, call ahead and get instructions from the provider before arriving 

at the medical office, clinic or hospital.  Notify them that you are being 

tested for the virus that causes COVID-19 so that arrangements can be made, as 

necessary, to prevent transmission to others in the healthcare setting.  Next, 

notify your local health department in your county.





If a medical emergency arises and you need to call 911, inform the first 

responders that you are being tested for the virus that causes COVID-19.  Next, 

notify the local health department and your County.





Adhere to all guidance set forth by the [place] division of public health

for home care of patients that is based on guidance from the Centers for Disease

Control and Prevention with suspected or confirmed COVID-19.





Your health and the health of our community are top priorities.  [facility] health 

officials remain available to provide assistance and counseling T about COVID-19

and compliance with his guidance.


Referrals: 


NED LANZA MD [ACTIVE STAFF] - Follow up tomorrow (The office is supposed to 

call you to schedule a time tomorrow to come get a monitor put on.)





I personally performed the services described in the documentation, reviewed and

edited the documentation which was dictated to the scribe in my presence, and it

accurately records my words and actions.

## 2020-07-16 NOTE — RADIOLOGY REPORT (SQ)
EXAM DESCRIPTION:  CHEST SINGLE VIEW



IMAGES COMPLETED DATE/TIME:  7/16/2020 8:26 am



REASON FOR STUDY:  Micturition syncope



COMPARISON:  9/28/2017



EXAM PARAMETERS:  NUMBER OF VIEWS: One view.

TECHNIQUE: Single frontal radiographic view of the chest acquired.

RADIATION DOSE: NA

LIMITATIONS: None.



FINDINGS:  LUNGS AND PLEURA: Chronic appearing interstitial lung disease.  Calcified pleural plaque. 
 Blunting of both costophrenic angles which is stable most likely chronic.  No pneumothorax.

MEDIASTINUM AND HILAR STRUCTURES: No masses.  Contour normal.

HEART AND VASCULAR STRUCTURES: Stable in appearance.

BONES: No acute findings.

HARDWARE: None in the chest.

OTHER: No other significant finding.



IMPRESSION:  Chronic bilateral pleural and parenchymal changes as described.  No acute findings.



TECHNICAL DOCUMENTATION:  JOB ID:  [id number]

 2011 [employer]- All Rights Reserved



Reading location - IP/workstation name: [email or web address]

## 2020-07-26 ENCOUNTER — HOSPITAL ENCOUNTER (INPATIENT)
Dept: HOSPITAL 62 - ER | Age: 80
LOS: 9 days | Discharge: HOME | DRG: 177 | End: 2020-08-04
Attending: INTERNAL MEDICINE | Admitting: FAMILY MEDICINE
Payer: MEDICARE

## 2020-07-26 DIAGNOSIS — I47.1: ICD-10-CM

## 2020-07-26 DIAGNOSIS — I25.10: ICD-10-CM

## 2020-07-26 DIAGNOSIS — J12.89: ICD-10-CM

## 2020-07-26 DIAGNOSIS — J44.1: ICD-10-CM

## 2020-07-26 DIAGNOSIS — E78.5: ICD-10-CM

## 2020-07-26 DIAGNOSIS — I10: ICD-10-CM

## 2020-07-26 DIAGNOSIS — G47.33: ICD-10-CM

## 2020-07-26 DIAGNOSIS — Z95.5: ICD-10-CM

## 2020-07-26 DIAGNOSIS — U07.1: Primary | ICD-10-CM

## 2020-07-26 DIAGNOSIS — G93.41: ICD-10-CM

## 2020-07-26 DIAGNOSIS — J96.01: ICD-10-CM

## 2020-07-26 LAB
ADD MANUAL DIFF: NO
ALBUMIN SERPL-MCNC: 3.6 G/DL (ref 3.5–5)
ALP SERPL-CCNC: 104 U/L (ref 38–126)
ANION GAP SERPL CALC-SCNC: 10 MMOL/L (ref 5–19)
APTT BLD: 33.3 SEC (ref 23.5–35.8)
AST SERPL-CCNC: 53 U/L (ref 17–59)
BASE EXCESS BLDV CALC-SCNC: -5.7 MMOL/L
BASOPHILS # BLD AUTO: 0 10^3/UL (ref 0–0.2)
BASOPHILS NFR BLD AUTO: 0.2 % (ref 0–2)
BILIRUB DIRECT SERPL-MCNC: 0.2 MG/DL (ref 0–0.4)
BILIRUB SERPL-MCNC: 1 MG/DL (ref 0.2–1.3)
BUN SERPL-MCNC: 23 MG/DL (ref 7–20)
CALCIUM: 9.2 MG/DL (ref 8.4–10.2)
CHLORIDE SERPL-SCNC: 103 MMOL/L (ref 98–107)
CO2 SERPL-SCNC: 20 MMOL/L (ref 22–30)
EOSINOPHIL # BLD AUTO: 0.1 10^3/UL (ref 0–0.6)
EOSINOPHIL NFR BLD AUTO: 1.5 % (ref 0–6)
ERYTHROCYTE [DISTWIDTH] IN BLOOD BY AUTOMATED COUNT: 14 % (ref 11.5–14)
FIBRINOGEN PPP-MCNC: 865 MG/DL (ref 209–497)
GLUCOSE SERPL-MCNC: 117 MG/DL (ref 75–110)
HCO3 BLDV-SCNC: 19.8 MMOL/L (ref 20–32)
HCT VFR BLD CALC: 41.2 % (ref 37.9–51)
HGB BLD-MCNC: 14.4 G/DL (ref 13.5–17)
INR PPP: 1.12
LYMPHOCYTES # BLD AUTO: 0.6 10^3/UL (ref 0.5–4.7)
LYMPHOCYTES NFR BLD AUTO: 6.8 % (ref 13–45)
MCH RBC QN AUTO: 33 PG (ref 27–33.4)
MCHC RBC AUTO-ENTMCNC: 34.9 G/DL (ref 32–36)
MCV RBC AUTO: 95 FL (ref 80–97)
MONOCYTES # BLD AUTO: 0.9 10^3/UL (ref 0.1–1.4)
MONOCYTES NFR BLD AUTO: 10.6 % (ref 3–13)
NEUTROPHILS # BLD AUTO: 6.9 10^3/UL (ref 1.7–8.2)
NEUTS SEG NFR BLD AUTO: 80.9 % (ref 42–78)
NT PRO BNP: 180 PG/ML (ref ?–450)
PCO2 BLDV: 38.8 MMHG (ref 35–63)
PH BLDV: 7.33 [PH] (ref 7.3–7.42)
PLATELET # BLD: 295 10^3/UL (ref 150–450)
POTASSIUM SERPL-SCNC: 4.6 MMOL/L (ref 3.6–5)
PROT SERPL-MCNC: 7.5 G/DL (ref 6.3–8.2)
PROTHROMBIN TIME: 14.5 SEC (ref 11.4–15.4)
RBC # BLD AUTO: 4.36 10^6/UL (ref 4.35–5.55)
TOTAL CELLS COUNTED % (AUTO): 100 %
TROPONIN I SERPL-MCNC: < 0.012 NG/ML
WBC # BLD AUTO: 8.5 10^3/UL (ref 4–10.5)

## 2020-07-26 PROCEDURE — 87040 BLOOD CULTURE FOR BACTERIA: CPT

## 2020-07-26 PROCEDURE — 99285 EMERGENCY DEPT VISIT HI MDM: CPT

## 2020-07-26 PROCEDURE — 85652 RBC SED RATE AUTOMATED: CPT

## 2020-07-26 PROCEDURE — 84484 ASSAY OF TROPONIN QUANT: CPT

## 2020-07-26 PROCEDURE — 85610 PROTHROMBIN TIME: CPT

## 2020-07-26 PROCEDURE — 71045 X-RAY EXAM CHEST 1 VIEW: CPT

## 2020-07-26 PROCEDURE — 82728 ASSAY OF FERRITIN: CPT

## 2020-07-26 PROCEDURE — 96374 THER/PROPH/DIAG INJ IV PUSH: CPT

## 2020-07-26 PROCEDURE — 94799 UNLISTED PULMONARY SVC/PX: CPT

## 2020-07-26 PROCEDURE — 85384 FIBRINOGEN ACTIVITY: CPT

## 2020-07-26 PROCEDURE — 96361 HYDRATE IV INFUSION ADD-ON: CPT

## 2020-07-26 PROCEDURE — 85025 COMPLETE CBC W/AUTO DIFF WBC: CPT

## 2020-07-26 PROCEDURE — 87150 DNA/RNA AMPLIFIED PROBE: CPT

## 2020-07-26 PROCEDURE — 81001 URINALYSIS AUTO W/SCOPE: CPT

## 2020-07-26 PROCEDURE — 80061 LIPID PANEL: CPT

## 2020-07-26 PROCEDURE — 86140 C-REACTIVE PROTEIN: CPT

## 2020-07-26 PROCEDURE — 93005 ELECTROCARDIOGRAM TRACING: CPT

## 2020-07-26 PROCEDURE — 83520 IMMUNOASSAY QUANT NOS NONAB: CPT

## 2020-07-26 PROCEDURE — 85730 THROMBOPLASTIN TIME PARTIAL: CPT

## 2020-07-26 PROCEDURE — 84481 FREE ASSAY (FT-3): CPT

## 2020-07-26 PROCEDURE — 94640 AIRWAY INHALATION TREATMENT: CPT

## 2020-07-26 PROCEDURE — 80053 COMPREHEN METABOLIC PANEL: CPT

## 2020-07-26 PROCEDURE — 87077 CULTURE AEROBIC IDENTIFY: CPT

## 2020-07-26 PROCEDURE — 36415 COLL VENOUS BLD VENIPUNCTURE: CPT

## 2020-07-26 PROCEDURE — 83880 ASSAY OF NATRIURETIC PEPTIDE: CPT

## 2020-07-26 PROCEDURE — 87186 SC STD MICRODIL/AGAR DIL: CPT

## 2020-07-26 PROCEDURE — 93010 ELECTROCARDIOGRAM REPORT: CPT

## 2020-07-26 PROCEDURE — 85379 FIBRIN DEGRADATION QUANT: CPT

## 2020-07-26 PROCEDURE — 80048 BASIC METABOLIC PNL TOTAL CA: CPT

## 2020-07-26 PROCEDURE — 85027 COMPLETE CBC AUTOMATED: CPT

## 2020-07-26 PROCEDURE — 83735 ASSAY OF MAGNESIUM: CPT

## 2020-07-26 PROCEDURE — 84443 ASSAY THYROID STIM HORMONE: CPT

## 2020-07-26 PROCEDURE — 82962 GLUCOSE BLOOD TEST: CPT

## 2020-07-26 PROCEDURE — 94660 CPAP INITIATION&MGMT: CPT

## 2020-07-26 PROCEDURE — 82803 BLOOD GASES ANY COMBINATION: CPT

## 2020-07-26 RX ADMIN — Medication SCH ML: at 21:21

## 2020-07-26 RX ADMIN — FAMOTIDINE SCH MG: 20 TABLET, FILM COATED ORAL at 21:19

## 2020-07-26 RX ADMIN — ENOXAPARIN SODIUM SCH MG: 100 INJECTION SUBCUTANEOUS at 21:19

## 2020-07-26 NOTE — RADIOLOGY REPORT (SQ)
EXAM DESCRIPTION:  CHEST SINGLE VIEW



IMAGES COMPLETED DATE/TIME:  7/26/2020 5:42 pm



REASON FOR STUDY:  COVID+ SOB hypoxia



COMPARISON:  7/16/2020



EXAM PARAMETERS:  NUMBER OF VIEWS: One view.

TECHNIQUE: Single frontal radiographic view of the chest acquired.

RADIATION DOSE: NA

LIMITATIONS: None.



FINDINGS:  LUNGS AND PLEURA: There is worsening consolidation in the right upper lobe, right lower lo
be, and left peripheral upper and lower lobes.  No pleural effusion or pneumothorax.

MEDIASTINUM AND HILAR STRUCTURES: No masses.  Contour normal.

HEART AND VASCULAR STRUCTURES: Heart normal in size.  Normal vasculature.

BONES: No acute findings.

HARDWARE: None in the chest.

OTHER: No other significant finding.



IMPRESSION:  Worsening areas of consolidation in both lungs.



TECHNICAL DOCUMENTATION:  JOB ID:  [id number]

 2011 [employer]- All Rights Reserved



Reading location - IP/workstation name: [email or web address]

## 2020-07-26 NOTE — ER DOCUMENT REPORT
ED General





- General


Chief Complaint: Fever


Stated Complaint: CHEST PAIN


TRAVEL OUTSIDE OF THE [place] IN LAST 30 DAYS: No





- HPI


Notes: 





80-year-old male history of COPD (no prior intubations, no home O2), 

hypertension hyperlipidemia CAD presents with persistent fever and shortness of 

breath after covered diagnosis.  Patient began having symptoms July 13 and had 

positive flu test July 16.  Since then patient has had persistent fevers and 

shortness of breath.  Patient endorses dry cough, denies chest pain despite 

chief complaint having been listed as chest pain.





- Related Data


Allergies/Adverse Reactions: 


                                        





No Known Allergies Allergy (Verified 07/16/20 07:23)


   











Past Medical History





- General


Information source: Patient





- Social History


Smoking Status: Former Smoker


Frequency of alcohol use: None


Drug Abuse: None


Family History: Reviewed & Not Pertinent





- Past Medical History


Cardiac Medical History: Reports: Hx Coronary Artery Disease - CARDIAC STENT X 

2/ RAPID HEART RATE, Hx Heart Attack - 8/16  2 STENTS PLACED, Hx 

Hypercholesterolemia, Hx Hypertension


Pulmonary Medical History: Reports: Hx COPD


   Denies: Hx Asthma, Hx Bronchitis, Hx Pneumonia


Neurological Medical History: Denies: Hx Cerebrovascular Accident, Hx Seizures


Renal/ Medical History: Denies: Hx Peritoneal Dialysis


GI Medical History: Denies: Hx Hepatitis, Hx Hiatal Hernia, Hx Ulcer


Musculoskeletal Medical History: Reports Hx Arthritis - generalized


Infectious Medical History: Denies: Hx Hepatitis


Past Surgical History: Reports: Hx Abdominal Surgery.  Denies: Hx Open Heart 

Surgery, Hx Pacemaker





- Immunizations


Hx Diphtheria, Pertussis, Tetanus Vaccination: No


Hx Pneumococcal Vaccination: 11/01/12





Review of Systems





- Review of Systems


Notes: 





REVIEW OF SYSTEMS:


CONSTITUTIONAL :  + fever,  +chills, or sweats. 


EENT: Denies recent cold/sinus symptoms, denies throat pain


CARDIOVASCULAR:  Denies chest pain, MURRAY


RESPIRATORY:  + cough, + shortness of breath.


GASTROINTESTINAL:  Denies abdominal pain, nausea/vomiting.


GENITOURINARY:  Denies difficulty urinating, painful urination.


MUSCULOSKELETAL:  Denies neck pain, back pain.


SKIN:   Denies rash or skin lesions.


HEMATOLOGIC :   Denies easy bruising or bleeding.


LYMPHATIC:  Denies swollen, enlarged glands.


NEUROLOGICAL:  Denies headache, denies change in gait.


PSYCHIATRIC:  Denies anxiety or stress or depression.





Physical Exam





- Vital signs


Vitals: 


                                        











Temp Pulse Resp BP Pulse Ox


 


 97.9 F   106 H  23 H  116/55 L  86 L


 


 07/26/20 17:20  07/26/20 17:20  07/26/20 17:20  07/26/20 17:20  07/26/20 17:20














- Notes


Notes: 





PHYSICAL EXAMINATION:


 


GENERAL: Mildly uncomfortable appearing but no signs of acute distress.


 


HEAD: Atraumatic, normocephalic.


 


EYES: Pupils equal round and appropriate constriction, sclera anicteric, 

conjunctiva are normal.


 


ENT: nares patent, dry mucous membranes, nasal cannula in place


 


NECK: Normal range of motion, supple without lymphadenopathy


 


LUNGS: Mildly tachypneic, no accessory muscle use, speaking in few word 

sentences, good air movement, bilateral expiratory wheezing


 


HEART: Borderline tachycardic with regular rhythm, no murmurs rubs or gallops


 


ABDOMEN: Soft, nontender, no guarding, no masses, no CVAT


 


EXTREMITIES: Normal range of motion, no pitting or edema.  No cyanosis.


 


NEUROLOGICAL: Awake, alert, conversing appropriately, moves all extremities 

spontaneously.


 


PSYCH: Normal mood, normal affect.


 


SKIN: Warm, Dry, normal turgor, no rashes or lesions noted.





Course





- Re-evaluation


Re-evalutation: 





07/26/20 19:43


Patient with fever and shortness of breath positive COVID diagnosis, pr

esentation consistent with worsening respiratory status secondary to COVID 

complicated by COPD.  No acute respiratory distress, but patient is hypoxic 

requiring 5 L of nasal cannula to maintain O2 saturation above 91 to 93%.  

Patient clinically dehydrated likely secondary to 2 weeks of fever with great 

insensible losses, will give conservative fluid bolus over 2 hours.  Patient 

requiring admission given hypoxia, discussed case with hospitalist Dr. Weiland 

who has accepted patient to medical floor although patient will be housed in 

[facility].








- Vital Signs


Vital signs: 


                                        











Temp Pulse Resp BP Pulse Ox


 


 98.2 F   96   14   130/65 H  92 


 


 07/26/20 22:00  07/27/20 00:00  07/27/20 00:00  07/26/20 22:00  07/27/20 00:00














- Laboratory


Result Diagrams: 


                                 07/26/20 17:40





                                 07/26/20 17:40


Laboratory results interpreted by me: 


                                        











  07/26/20 07/26/20 07/26/20





  17:40 17:40 17:40


 


Lymph % (Auto)  6.8 L  


 


Seg Neutrophils %  80.9 H  


 


Fibrinogen   865 H 


 


D-Dimer   


 


VBG HCO3   


 


Sodium    132.5 L


 


Carbon Dioxide    20 L


 


BUN    23 H


 


Est GFR (MDRD) Non-Af    56 L


 


Glucose    117 H


 


Free T3 pg/mL   














  07/26/20 07/26/20 07/26/20





  17:40 17:40 20:10


 


Lymph % (Auto)   


 


Seg Neutrophils %   


 


Fibrinogen   


 


D-Dimer  3.89 H  


 


VBG HCO3    19.8 L


 


Sodium   


 


Carbon Dioxide   


 


BUN   


 


Est GFR (MDRD) Non-Af   


 


Glucose   


 


Free T3 pg/mL   2.50 L 














Discharge





- Discharge


Clinical Impression: 


 COVID-19, COPD exacerbation, Hypoxia





Disposition: ADMITTED AS INPATIENT


Admitting Provider: Weiland (Hospitalist)


Unit Admitted: Medical Floor

## 2020-07-27 LAB
ANION GAP SERPL CALC-SCNC: 7 MMOL/L (ref 5–19)
APPEARANCE UR: CLEAR
APTT PPP: YELLOW S
BASE EXCESS BLDV CALC-SCNC: -4.2 MMOL/L
BILIRUB UR QL STRIP: NEGATIVE
BUN SERPL-MCNC: 22 MG/DL (ref 7–20)
CALCIUM: 8.8 MG/DL (ref 8.4–10.2)
CHLORIDE SERPL-SCNC: 110 MMOL/L (ref 98–107)
CHOLEST SERPL-MCNC: 138.87 MG/DL (ref 0–200)
CO2 SERPL-SCNC: 22 MMOL/L (ref 22–30)
ERYTHROCYTE [DISTWIDTH] IN BLOOD BY AUTOMATED COUNT: 14 % (ref 11.5–14)
GLUCOSE SERPL-MCNC: 163 MG/DL (ref 75–110)
GLUCOSE UR STRIP-MCNC: 50 MG/DL
HCO3 BLDV-SCNC: 21.1 MMOL/L (ref 20–32)
HCT VFR BLD CALC: 40.2 % (ref 37.9–51)
HGB BLD-MCNC: 14 G/DL (ref 13.5–17)
KETONES UR STRIP-MCNC: NEGATIVE MG/DL
LDLC SERPL DIRECT ASSAY-MCNC: 72 MG/DL (ref ?–100)
MCH RBC QN AUTO: 33.3 PG (ref 27–33.4)
MCHC RBC AUTO-ENTMCNC: 34.9 G/DL (ref 32–36)
MCV RBC AUTO: 95 FL (ref 80–97)
PCO2 BLDV: 40 MMHG (ref 35–63)
PH BLDV: 7.34 [PH] (ref 7.3–7.42)
PH UR STRIP: 6 [PH] (ref 5–9)
PLATELET # BLD: 251 10^3/UL (ref 150–450)
POTASSIUM SERPL-SCNC: 4.4 MMOL/L (ref 3.6–5)
PROT UR STRIP-MCNC: NEGATIVE MG/DL
RBC # BLD AUTO: 4.21 10^6/UL (ref 4.35–5.55)
SP GR UR STRIP: 1.01
TRIGL SERPL-MCNC: 121 MG/DL (ref ?–150)
UROBILINOGEN UR-MCNC: NEGATIVE MG/DL (ref ?–2)
VLDLC SERPL CALC-MCNC: 24 MG/DL (ref 10–31)
WBC # BLD AUTO: 5.1 10^3/UL (ref 4–10.5)

## 2020-07-27 RX ADMIN — ALBUTEROL SULFATE SCH PUFF: 90 AEROSOL, METERED RESPIRATORY (INHALATION) at 22:10

## 2020-07-27 RX ADMIN — ENOXAPARIN SODIUM SCH MG: 100 INJECTION SUBCUTANEOUS at 10:45

## 2020-07-27 RX ADMIN — Medication SCH: at 23:49

## 2020-07-27 RX ADMIN — GUAIFENESIN SCH MG: 600 TABLET, EXTENDED RELEASE ORAL at 10:44

## 2020-07-27 RX ADMIN — DOCUSATE SODIUM SCH MG: 100 CAPSULE, LIQUID FILLED ORAL at 17:50

## 2020-07-27 RX ADMIN — ATORVASTATIN CALCIUM SCH MG: 40 TABLET, FILM COATED ORAL at 22:09

## 2020-07-27 RX ADMIN — Medication SCH: at 13:47

## 2020-07-27 RX ADMIN — IPRATROPIUM BROMIDE SCH MG: 0.5 SOLUTION RESPIRATORY (INHALATION) at 15:48

## 2020-07-27 RX ADMIN — DOCUSATE SODIUM SCH MG: 100 CAPSULE, LIQUID FILLED ORAL at 10:44

## 2020-07-27 RX ADMIN — FAMOTIDINE SCH MG: 20 TABLET, FILM COATED ORAL at 22:09

## 2020-07-27 RX ADMIN — IPRATROPIUM BROMIDE SCH MG: 0.5 SOLUTION RESPIRATORY (INHALATION) at 00:00

## 2020-07-27 RX ADMIN — Medication SCH MG: at 10:44

## 2020-07-27 RX ADMIN — CEFTRIAXONE SCH MLS/HR: 1 INJECTION, SOLUTION INTRAVENOUS at 22:11

## 2020-07-27 RX ADMIN — FAMOTIDINE SCH MG: 20 TABLET, FILM COATED ORAL at 10:44

## 2020-07-27 RX ADMIN — LEVALBUTEROL HYDROCHLORIDE SCH MG: 1.25 SOLUTION RESPIRATORY (INHALATION) at 15:48

## 2020-07-27 RX ADMIN — GUAIFENESIN SCH MG: 600 TABLET, EXTENDED RELEASE ORAL at 22:09

## 2020-07-27 RX ADMIN — Medication SCH: at 05:45

## 2020-07-27 RX ADMIN — IPRATROPIUM BROMIDE SCH MG: 0.5 SOLUTION RESPIRATORY (INHALATION) at 09:25

## 2020-07-27 RX ADMIN — VITAMIN D, TAB 1000IU (100/BT) SCH UNIT: 25 TAB at 10:44

## 2020-07-27 RX ADMIN — LEVALBUTEROL HYDROCHLORIDE SCH MG: 1.25 SOLUTION RESPIRATORY (INHALATION) at 00:00

## 2020-07-27 RX ADMIN — OXYCODONE HYDROCHLORIDE AND ACETAMINOPHEN SCH MG: 500 TABLET ORAL at 10:44

## 2020-07-27 RX ADMIN — ENOXAPARIN SODIUM SCH MG: 100 INJECTION SUBCUTANEOUS at 22:12

## 2020-07-27 RX ADMIN — AZITHROMYCIN MONOHYDRATE SCH MLS/HR: 500 INJECTION, POWDER, LYOPHILIZED, FOR SOLUTION INTRAVENOUS at 23:44

## 2020-07-27 RX ADMIN — DEXAMETHASONE SODIUM PHOSPHATE SCH MG: 4 INJECTION, SOLUTION INTRAMUSCULAR; INTRAVENOUS at 22:10

## 2020-07-27 RX ADMIN — Medication PRN MG: at 00:20

## 2020-07-27 RX ADMIN — SODIUM CHLORIDE, SODIUM LACTATE, POTASSIUM CHLORIDE, AND CALCIUM CHLORIDE PRN MLS/HR: .6; .31; .03; .02 INJECTION, SOLUTION INTRAVENOUS at 11:25

## 2020-07-27 RX ADMIN — DEXAMETHASONE SODIUM PHOSPHATE SCH MG: 4 INJECTION, SOLUTION INTRAMUSCULAR; INTRAVENOUS at 14:07

## 2020-07-27 RX ADMIN — Medication PRN MG: at 22:09

## 2020-07-27 RX ADMIN — LEVALBUTEROL HYDROCHLORIDE SCH MG: 1.25 SOLUTION RESPIRATORY (INHALATION) at 09:25

## 2020-07-27 NOTE — PDOC PROGRESS REPORT
Subjective


Progress Note for:: 07/27/20


Subjective:: 





Patient states that he feels fatigued and has diarrhea.  Admits to loss of 

taste.  This limits his food intake.  Denies using oxygen at home.


Reason For Visit: 


COVID-19 PNEUMONIA,ACUTE EXACERBATION OF CHRONIC








Physical Exam


Vital Signs: 


                                        











Temp Pulse Resp BP Pulse Ox


 


 97.8 F   89   20   126/56 H  93 


 


 07/27/20 14:45  07/27/20 15:50  07/27/20 15:50  07/27/20 14:45  07/27/20 15:50








                                 Intake & Output











 07/26/20 07/27/20 07/28/20





 06:59 06:59 06:59


 


Intake Total  1300 1000


 


Balance  1300 1000


 


Weight  83 kg 











General appearance: PRESENT: no acute distress, cooperative


Neck exam: ABSENT: JVD


Respiratory exam: PRESENT: crackles, symmetrical, unlabored.  ABSENT: tachypnea,

wheezes


Cardiovascular exam: PRESENT: RRR, +S1, +S2.  ABSENT: tachycardia


GI/Abdominal exam: PRESENT: soft.  ABSENT: rebound, rigid, tenderness


Extremities exam: ABSENT: pedal edema


Neurological exam: PRESENT: alert, awake, oriented to person, oriented to place,

oriented to time





Results


Laboratory Results: 


                                        





                                 07/27/20 06:57 





                                 07/27/20 06:57 





                                        











  07/26/20 07/26/20 07/26/20





  17:40 17:40 17:40


 


WBC  8.5  


 


RBC  4.36  


 


Hgb  14.4  


 


Hct  41.2  


 


MCV  95  


 


MCH  33.0  


 


MCHC  34.9  


 


RDW  14.0  


 


Plt Count  295  


 


Seg Neutrophils %  80.9 H  


 


VBG pH   


 


VBG pCO2   


 


VBG HCO3   


 


VBG Base Excess   


 


Sodium   132.5 L 


 


Potassium   4.6 


 


Chloride   103 


 


Carbon Dioxide   20 L 


 


Anion Gap   10 


 


BUN   23 H 


 


Creatinine   1.24 


 


Est GFR ( Amer)   > 60 


 


Glucose   117 H 


 


Calcium   9.2 


 


Magnesium   


 


Total Bilirubin   1.0 


 


AST   53 


 


Alkaline Phosphatase   104 


 


Total Protein   7.5 


 


Albumin   3.6 


 


Triglycerides   


 


Cholesterol   


 


LDL Cholesterol Direct   


 


VLDL Cholesterol   


 


HDL Cholesterol   


 


TSH   


 


Free T3 pg/mL    2.50 L


 


Urine Color   


 


Urine Appearance   


 


Urine pH   


 


Ur Specific Gravity   


 


Urine Protein   


 


Urine Glucose (UA)   


 


Urine Ketones   


 


Urine Blood   














  07/26/20 07/27/20 07/27/20





  20:10 06:57 06:57


 


WBC   5.1 


 


RBC   4.21 L 


 


Hgb   14.0 


 


Hct   40.2 


 


MCV   95 


 


MCH   33.3 


 


MCHC   34.9 


 


RDW   14.0 


 


Plt Count   251 


 


Seg Neutrophils %   


 


VBG pH  7.33  


 


VBG pCO2  38.8  


 


VBG HCO3  19.8 L  


 


VBG Base Excess  -5.7  


 


Sodium    138.5


 


Potassium    4.4


 


Chloride    110 H


 


Carbon Dioxide    22


 


Anion Gap    7


 


BUN    22 H


 


Creatinine    1.05


 


Est GFR ( Amer)    > 60


 


Glucose    163 H


 


Calcium    8.8


 


Magnesium    2.5 H


 


Total Bilirubin   


 


AST   


 


Alkaline Phosphatase   


 


Total Protein   


 


Albumin   


 


Triglycerides    121


 


Cholesterol    138.87


 


LDL Cholesterol Direct    72


 


VLDL Cholesterol    24.0


 


HDL Cholesterol    32 L


 


TSH   


 


Free T3 pg/mL   


 


Urine Color   


 


Urine Appearance   


 


Urine pH   


 


Ur Specific Gravity   


 


Urine Protein   


 


Urine Glucose (UA)   


 


Urine Ketones   


 


Urine Blood   














  07/27/20 07/27/20 07/27/20





  06:57 06:57 08:05


 


WBC   


 


RBC   


 


Hgb   


 


Hct   


 


MCV   


 


MCH   


 


MCHC   


 


RDW   


 


Plt Count   


 


Seg Neutrophils %   


 


VBG pH   7.34 


 


VBG pCO2   40.0 


 


VBG HCO3   21.1 


 


VBG Base Excess   -4.2 


 


Sodium   


 


Potassium   


 


Chloride   


 


Carbon Dioxide   


 


Anion Gap   


 


BUN   


 


Creatinine   


 


Est GFR (African Amer)   


 


Glucose   


 


Calcium   


 


Magnesium   


 


Total Bilirubin   


 


AST   


 


Alkaline Phosphatase   


 


Total Protein   


 


Albumin   


 


Triglycerides   


 


Cholesterol   


 


LDL Cholesterol Direct   


 


VLDL Cholesterol   


 


HDL Cholesterol   


 


TSH  0.92  


 


Free T3 pg/mL   


 


Urine Color    YELLOW


 


Urine Appearance    CLEAR


 


Urine pH    6.0


 


Ur Specific [employer]    1.011


 


Urine Protein    NEGATIVE


 


Urine Glucose (UA)    50 H


 


Urine Ketones    NEGATIVE


 


Urine Blood    NEGATIVE








                                        











  07/26/20





  17:40


 


Troponin I  < 0.012


 


NT-Pro-B Natriuret Pep  180











Impressions: 


                                        





Chest X-Ray  07/26/20 18:17


IMPRESSION:  Worsening areas of consolidation in both lungs.


 














Assessment and Plan





- Diagnosis


(1) Pneumonia due to COVID-19 virus


Is this a current diagnosis for this admission?: Yes   


Plan: 


Tested positive for COVID-19 on 7/16/2020.


Chest x-ray notable for bilateral pneumonia.  Currently afebrile.


Treating patient with ceftriaxone, azithromycin, zinc, vitamin C, vitamin D 

supplements.


D-dimer is elevated so patient is on therapeutic dose Lovenox








(2) Acute respiratory failure with hypoxia


Is this a current diagnosis for this admission?: Yes   


Plan: 


Secondary to COVID-19.  Currently requiring 5 L nasal cannula.  Started on 

Decadron IV and Remdesivir.  Incentive spirometer.  Goal SPO2 >89%.  Monitor 

closely.








(3) Acute infective exacerbation of chronic obstructive airway disease


Is this a current diagnosis for this admission?: Yes   


Plan: 


COPD exacerbation.  Likely triggered by COVID pneumonia.  Not much wheezing on 

my exam today.  Will treat with albuterol inhalers.  Continue his Trelegy.  On 

Decadron.








(4) Coronary artery disease


Qualifiers: 


   Coronary Disease-Associated Artery/Lesion type: native artery   Native vs. 

transplanted heart: native heart   Associated angina: without angina   Qualified

Code(s): I25.10 - Atherosclerotic heart disease of native coronary artery 

without angina pectoris   


Is this a current diagnosis for this admission?: Yes   


Plan: 


Continue aspirin and metoprolol and statin








(5) Hypertension


Qualifiers: 


   Hypertension type: essential hypertension   Qualified Code(s): I10 - 

Essential (primary) hypertension   


Is this a current diagnosis for this admission?: Yes   


Plan: 


Continue home meds








- Time


Time Spent with patient: 15-24 minutes


Anticipated Discharge Disposition: Home, Self Care


Anticipated Discharge: Other - undetermined

## 2020-07-28 LAB
ANION GAP SERPL CALC-SCNC: 6 MMOL/L (ref 5–19)
BUN SERPL-MCNC: 27 MG/DL (ref 7–20)
CALCIUM: 9.3 MG/DL (ref 8.4–10.2)
CHLORIDE SERPL-SCNC: 110 MMOL/L (ref 98–107)
CO2 SERPL-SCNC: 21 MMOL/L (ref 22–30)
CRP SERPL-MCNC: 88.3 MG/L (ref ?–10)
GLUCOSE SERPL-MCNC: 136 MG/DL (ref 75–110)
POTASSIUM SERPL-SCNC: 5 MMOL/L (ref 3.6–5)

## 2020-07-28 RX ADMIN — VITAMIN D, TAB 1000IU (100/BT) SCH UNIT: 25 TAB at 10:01

## 2020-07-28 RX ADMIN — GUAIFENESIN SCH MG: 600 TABLET, EXTENDED RELEASE ORAL at 22:41

## 2020-07-28 RX ADMIN — Medication SCH MG: at 10:01

## 2020-07-28 RX ADMIN — Medication SCH ML: at 22:42

## 2020-07-28 RX ADMIN — ALBUTEROL SULFATE SCH: 90 AEROSOL, METERED RESPIRATORY (INHALATION) at 23:49

## 2020-07-28 RX ADMIN — Medication PRN MG: at 22:42

## 2020-07-28 RX ADMIN — IPRATROPIUM BROMIDE SCH MG: 0.5 SOLUTION RESPIRATORY (INHALATION) at 01:58

## 2020-07-28 RX ADMIN — ENOXAPARIN SODIUM SCH MG: 100 INJECTION SUBCUTANEOUS at 22:42

## 2020-07-28 RX ADMIN — FAMOTIDINE SCH MG: 20 TABLET, FILM COATED ORAL at 22:41

## 2020-07-28 RX ADMIN — SODIUM CHLORIDE, SODIUM LACTATE, POTASSIUM CHLORIDE, AND CALCIUM CHLORIDE PRN MLS/HR: .6; .31; .03; .02 INJECTION, SOLUTION INTRAVENOUS at 08:00

## 2020-07-28 RX ADMIN — ASPIRIN SCH MG: 81 TABLET, COATED ORAL at 10:02

## 2020-07-28 RX ADMIN — AZITHROMYCIN MONOHYDRATE SCH MLS/HR: 500 INJECTION, POWDER, LYOPHILIZED, FOR SOLUTION INTRAVENOUS at 23:42

## 2020-07-28 RX ADMIN — DEXAMETHASONE SODIUM PHOSPHATE SCH MG: 4 INJECTION, SOLUTION INTRAMUSCULAR; INTRAVENOUS at 13:04

## 2020-07-28 RX ADMIN — CEFTRIAXONE SCH MLS/HR: 1 INJECTION, SOLUTION INTRAVENOUS at 22:41

## 2020-07-28 RX ADMIN — FAMOTIDINE SCH MG: 20 TABLET, FILM COATED ORAL at 10:02

## 2020-07-28 RX ADMIN — ALBUTEROL SULFATE SCH PUFF: 90 AEROSOL, METERED RESPIRATORY (INHALATION) at 18:40

## 2020-07-28 RX ADMIN — DOCUSATE SODIUM SCH MG: 100 CAPSULE, LIQUID FILLED ORAL at 10:02

## 2020-07-28 RX ADMIN — Medication SCH ML: at 05:14

## 2020-07-28 RX ADMIN — REMDESIVIR SCH MLS/HR: 100 INJECTION, POWDER, LYOPHILIZED, FOR SOLUTION INTRAVENOUS at 10:10

## 2020-07-28 RX ADMIN — DOCUSATE SODIUM SCH MG: 100 CAPSULE, LIQUID FILLED ORAL at 18:41

## 2020-07-28 RX ADMIN — OXYCODONE HYDROCHLORIDE AND ACETAMINOPHEN SCH MG: 500 TABLET ORAL at 10:02

## 2020-07-28 RX ADMIN — FLUTICASONE FUROATE, UMECLIDINIUM BROMIDE AND VILANTEROL TRIFENATATE SCH INHALER: 100; 62.5; 25 POWDER RESPIRATORY (INHALATION) at 10:05

## 2020-07-28 RX ADMIN — DEXAMETHASONE SODIUM PHOSPHATE SCH MG: 4 INJECTION, SOLUTION INTRAMUSCULAR; INTRAVENOUS at 05:13

## 2020-07-28 RX ADMIN — IPRATROPIUM BROMIDE SCH MG: 0.5 SOLUTION RESPIRATORY (INHALATION) at 08:57

## 2020-07-28 RX ADMIN — ALBUTEROL SULFATE SCH PUFF: 90 AEROSOL, METERED RESPIRATORY (INHALATION) at 05:18

## 2020-07-28 RX ADMIN — GUAIFENESIN SCH MG: 600 TABLET, EXTENDED RELEASE ORAL at 10:02

## 2020-07-28 RX ADMIN — METOPROLOL SUCCINATE SCH MG: 25 TABLET, EXTENDED RELEASE ORAL at 10:04

## 2020-07-28 RX ADMIN — ENOXAPARIN SODIUM SCH MG: 100 INJECTION SUBCUTANEOUS at 10:02

## 2020-07-28 RX ADMIN — Medication SCH ML: at 13:04

## 2020-07-28 RX ADMIN — ALBUTEROL SULFATE SCH: 90 AEROSOL, METERED RESPIRATORY (INHALATION) at 05:10

## 2020-07-28 RX ADMIN — IPRATROPIUM BROMIDE SCH MG: 0.5 SOLUTION RESPIRATORY (INHALATION) at 19:05

## 2020-07-28 RX ADMIN — ATORVASTATIN CALCIUM SCH MG: 40 TABLET, FILM COATED ORAL at 22:41

## 2020-07-28 RX ADMIN — ALBUTEROL SULFATE SCH PUFF: 90 AEROSOL, METERED RESPIRATORY (INHALATION) at 11:48

## 2020-07-28 RX ADMIN — DEXAMETHASONE SODIUM PHOSPHATE SCH MG: 4 INJECTION, SOLUTION INTRAMUSCULAR; INTRAVENOUS at 22:42

## 2020-07-29 LAB
ANION GAP SERPL CALC-SCNC: 5 MMOL/L (ref 5–19)
APPEARANCE UR: CLEAR
APTT PPP: YELLOW S
BILIRUB UR QL STRIP: NEGATIVE
BUN SERPL-MCNC: 26 MG/DL (ref 7–20)
CALCIUM: 9.2 MG/DL (ref 8.4–10.2)
CHLORIDE SERPL-SCNC: 110 MMOL/L (ref 98–107)
CO2 SERPL-SCNC: 21 MMOL/L (ref 22–30)
CRP SERPL-MCNC: 43.6 MG/L (ref ?–10)
ERYTHROCYTE [DISTWIDTH] IN BLOOD BY AUTOMATED COUNT: 13.9 % (ref 11.5–14)
ERYTHROCYTE [SEDIMENTATION RATE] IN BLOOD: 62 MM/HR (ref 0–20)
GLUCOSE SERPL-MCNC: 128 MG/DL (ref 75–110)
GLUCOSE UR STRIP-MCNC: NEGATIVE MG/DL
HCT VFR BLD CALC: 39.5 % (ref 37.9–51)
HGB BLD-MCNC: 13.6 G/DL (ref 13.5–17)
KETONES UR STRIP-MCNC: NEGATIVE MG/DL
MCH RBC QN AUTO: 32.8 PG (ref 27–33.4)
MCHC RBC AUTO-ENTMCNC: 34.4 G/DL (ref 32–36)
MCV RBC AUTO: 95 FL (ref 80–97)
NITRITE UR QL STRIP: NEGATIVE
PH UR STRIP: 6 [PH] (ref 5–9)
PLATELET # BLD: 332 10^3/UL (ref 150–450)
POTASSIUM SERPL-SCNC: 4.8 MMOL/L (ref 3.6–5)
PROT UR STRIP-MCNC: NEGATIVE MG/DL
RBC # BLD AUTO: 4.14 10^6/UL (ref 4.35–5.55)
SP GR UR STRIP: 1.02
UROBILINOGEN UR-MCNC: NEGATIVE MG/DL (ref ?–2)
WBC # BLD AUTO: 11 10^3/UL (ref 4–10.5)

## 2020-07-29 RX ADMIN — Medication SCH: at 13:47

## 2020-07-29 RX ADMIN — Medication PRN MG: at 22:18

## 2020-07-29 RX ADMIN — DEXAMETHASONE SODIUM PHOSPHATE SCH MG: 4 INJECTION, SOLUTION INTRAMUSCULAR; INTRAVENOUS at 13:58

## 2020-07-29 RX ADMIN — REMDESIVIR SCH MLS/HR: 100 INJECTION, POWDER, LYOPHILIZED, FOR SOLUTION INTRAVENOUS at 13:57

## 2020-07-29 RX ADMIN — DEXAMETHASONE SODIUM PHOSPHATE SCH MG: 4 INJECTION, SOLUTION INTRAMUSCULAR; INTRAVENOUS at 22:19

## 2020-07-29 RX ADMIN — DEXAMETHASONE SODIUM PHOSPHATE SCH MG: 4 INJECTION, SOLUTION INTRAMUSCULAR; INTRAVENOUS at 06:10

## 2020-07-29 RX ADMIN — ENOXAPARIN SODIUM SCH MG: 100 INJECTION SUBCUTANEOUS at 09:24

## 2020-07-29 RX ADMIN — Medication SCH MG: at 09:23

## 2020-07-29 RX ADMIN — ASPIRIN SCH MG: 81 TABLET, COATED ORAL at 09:23

## 2020-07-29 RX ADMIN — GUAIFENESIN SCH MG: 600 TABLET, EXTENDED RELEASE ORAL at 09:23

## 2020-07-29 RX ADMIN — DOCUSATE SODIUM SCH MG: 100 CAPSULE, LIQUID FILLED ORAL at 09:23

## 2020-07-29 RX ADMIN — ATORVASTATIN CALCIUM SCH MG: 40 TABLET, FILM COATED ORAL at 22:18

## 2020-07-29 RX ADMIN — FLUTICASONE FUROATE, UMECLIDINIUM BROMIDE AND VILANTEROL TRIFENATATE SCH INHALER: 100; 62.5; 25 POWDER RESPIRATORY (INHALATION) at 09:24

## 2020-07-29 RX ADMIN — FAMOTIDINE SCH MG: 20 TABLET, FILM COATED ORAL at 09:23

## 2020-07-29 RX ADMIN — IPRATROPIUM BROMIDE SCH MG: 0.5 SOLUTION RESPIRATORY (INHALATION) at 08:24

## 2020-07-29 RX ADMIN — ALBUTEROL SULFATE SCH: 90 AEROSOL, METERED RESPIRATORY (INHALATION) at 06:16

## 2020-07-29 RX ADMIN — IPRATROPIUM BROMIDE SCH: 0.5 SOLUTION RESPIRATORY (INHALATION) at 03:52

## 2020-07-29 RX ADMIN — Medication SCH ML: at 22:18

## 2020-07-29 RX ADMIN — CEFTRIAXONE SCH MLS/HR: 1 INJECTION, SOLUTION INTRAVENOUS at 22:19

## 2020-07-29 RX ADMIN — ALBUTEROL SULFATE SCH: 90 AEROSOL, METERED RESPIRATORY (INHALATION) at 13:47

## 2020-07-29 RX ADMIN — VITAMIN D, TAB 1000IU (100/BT) SCH UNIT: 25 TAB at 09:23

## 2020-07-29 RX ADMIN — METOPROLOL SUCCINATE SCH MG: 25 TABLET, EXTENDED RELEASE ORAL at 09:23

## 2020-07-29 RX ADMIN — ENOXAPARIN SODIUM SCH MG: 100 INJECTION SUBCUTANEOUS at 22:20

## 2020-07-29 RX ADMIN — OXYCODONE HYDROCHLORIDE AND ACETAMINOPHEN SCH MG: 500 TABLET ORAL at 09:23

## 2020-07-29 RX ADMIN — FAMOTIDINE SCH MG: 20 TABLET, FILM COATED ORAL at 22:18

## 2020-07-29 RX ADMIN — IPRATROPIUM BROMIDE SCH MG: 0.5 SOLUTION RESPIRATORY (INHALATION) at 16:17

## 2020-07-29 RX ADMIN — DOCUSATE SODIUM SCH: 100 CAPSULE, LIQUID FILLED ORAL at 17:13

## 2020-07-29 RX ADMIN — GUAIFENESIN SCH MG: 600 TABLET, EXTENDED RELEASE ORAL at 22:18

## 2020-07-29 RX ADMIN — AZITHROMYCIN MONOHYDRATE SCH MLS/HR: 500 INJECTION, POWDER, LYOPHILIZED, FOR SOLUTION INTRAVENOUS at 22:18

## 2020-07-29 RX ADMIN — Medication SCH ML: at 06:10

## 2020-07-29 NOTE — PDOC PROGRESS REPORT
Subjective


Progress Note for:: 07/29/20


Subjective:: 





Patient gets short of breath when he ambulates.  Still quite fatigued.


Reason For Visit: 


COVID-19 PNEUMONIA,ACUTE EXACERBATION OF CHRONIC








Physical Exam


Vital Signs: 


                                        











Temp Pulse Resp BP Pulse Ox


 


 97.7 F   90   15   124/73   93 


 


 07/29/20 16:08  07/29/20 16:17  07/29/20 16:17  07/29/20 16:08  07/29/20 16:17








                                 Intake & Output











 07/28/20 07/29/20 07/30/20





 06:59 06:59 06:59


 


Intake Total 2060 3627 360


 


Output Total 300 800 100


 


Balance 1760 2827 260


 


Weight 73 kg 72.9 kg 











Respiratory exam: ABSENT: tachypnea


Cardiovascular exam: PRESENT: RRR.  ABSENT: irregular rhythm, tachycardia


Psychiatric exam: PRESENT: anxious


Focused psych exam: ABSENT: pressured speech





Results


Laboratory Results: 


                                        





                                 07/29/20 05:22 





                                 07/29/20 05:22 





                                        











  07/29/20 07/29/20 07/29/20





  05:22 05:22 10:15


 


WBC  11.0 H D  


 


RBC  4.14 L  


 


Hgb  13.6  


 


Hct  39.5  


 


MCV  95  


 


MCH  32.8  


 


MCHC  34.4  


 


RDW  13.9  


 


Plt Count  332  


 


Sodium   135.8 L 


 


Potassium   4.8 


 


Chloride   110 H 


 


Carbon Dioxide   21 L 


 


Anion Gap   5 


 


BUN   26 H 


 


Creatinine   1.05 


 


Est GFR ( Amer)   > 60 


 


Glucose   128 H 


 


Calcium   9.2 


 


Magnesium   2.3 


 


C-Reactive Protein   43.6 H 


 


Urine Color    YELLOW


 


Urine Appearance    CLEAR


 


Urine pH    6.0


 


Ur Specific Gravity    1.018


 


Urine Protein    NEGATIVE


 


Urine Glucose (UA)    NEGATIVE


 


Urine Ketones    NEGATIVE


 


Urine Blood    NEGATIVE


 


Urine Nitrite    NEGATIVE


 


Ur Leukocyte Esterase    NEGATIVE


 


Urine WBC (Auto)    0


 


Urine RBC (Auto)    0








                                        





07/26/20 17:40   Blood   Blood Culture (PCR) - Final





                                        











  07/26/20





  17:40


 


Troponin I  < 0.012


 


NT-Pro-B Natriuret Pep  180











Impressions: 


                                        





Chest X-Ray  07/26/20 18:17


IMPRESSION:  Worsening areas of consolidation in both lungs.


 














Assessment and Plan





- Diagnosis


(1) Pneumonia due to COVID-19 virus


Is this a current diagnosis for this admission?: Yes   


Plan: 


Severe COVID Pneumonia


Tested positive for COVID-19 on 7/16/2020.


Chest x-ray notable for bilateral pneumonia.  Currently afebrile.


Treating patient with ceftriaxone, azithromycin, remdesivir, zinc, vitamin C, 

vitamin D supplements.


D-dimer is elevated so patient is on therapeutic dose Lovenox


T d-dimer is trending down


Monitor CBC and electrolytes








(2) Acute respiratory failure with hypoxia


Is this a current diagnosis for this admission?: Yes   


Plan: 


Secondary to COVID-19.  Continue IV Decadron and Remdesivir.  Noted to be on 3.5

L nasal cannula this morning.  Was cranked up to 5 L after patient ambulated and

got dyspneic.








(3) Acute infective exacerbation of chronic obstructive airway disease


Is this a current diagnosis for this admission?: Yes   


Plan: 


COPD exacerbation.  Likely triggered by COVID pneumonia.  Not wheezing on my 

exam today.  Will continue to treat with albuterol inhalers.  Continue his 

Trelegy.  On Decadron.








(4) Coronary artery disease


Qualifiers: 


   Coronary Disease-Associated Artery/Lesion type: native artery   Native vs. 

transplanted heart: native heart   Associated angina: without angina   Qualified

Code(s): I25.10 - Atherosclerotic heart disease of native coronary artery 

without angina pectoris   


Is this a current diagnosis for this admission?: Yes   


Plan: 


Continue aspirin and metoprolol and statin








(5) Hypertension


Qualifiers: 


   Hypertension type: essential hypertension   Qualified Code(s): I10 - 

Essential (primary) hypertension   


Is this a current diagnosis for this admission?: Yes   


Plan: 


Continue home meds








- Time


Time Spent with patient: Less than 15 minutes


Anticipated Discharge Disposition: Home, Self Care


Anticipated Discharge Timeframe: undetermined

## 2020-07-30 RX ADMIN — ATORVASTATIN CALCIUM SCH MG: 40 TABLET, FILM COATED ORAL at 21:42

## 2020-07-30 RX ADMIN — IPRATROPIUM BROMIDE SCH MG: 0.5 SOLUTION RESPIRATORY (INHALATION) at 09:44

## 2020-07-30 RX ADMIN — VITAMIN D, TAB 1000IU (100/BT) SCH UNIT: 25 TAB at 09:58

## 2020-07-30 RX ADMIN — ALBUTEROL SULFATE SCH: 90 AEROSOL, METERED RESPIRATORY (INHALATION) at 17:22

## 2020-07-30 RX ADMIN — ALBUTEROL SULFATE SCH: 90 AEROSOL, METERED RESPIRATORY (INHALATION) at 09:35

## 2020-07-30 RX ADMIN — ENOXAPARIN SODIUM SCH MG: 100 INJECTION SUBCUTANEOUS at 09:59

## 2020-07-30 RX ADMIN — ASPIRIN SCH MG: 81 TABLET, COATED ORAL at 09:58

## 2020-07-30 RX ADMIN — Medication SCH MG: at 09:58

## 2020-07-30 RX ADMIN — DEXAMETHASONE SODIUM PHOSPHATE SCH MG: 4 INJECTION, SOLUTION INTRAMUSCULAR; INTRAVENOUS at 05:46

## 2020-07-30 RX ADMIN — DOCUSATE SODIUM SCH: 100 CAPSULE, LIQUID FILLED ORAL at 17:22

## 2020-07-30 RX ADMIN — DEXAMETHASONE SODIUM PHOSPHATE SCH MG: 4 INJECTION, SOLUTION INTRAMUSCULAR; INTRAVENOUS at 13:48

## 2020-07-30 RX ADMIN — OXYCODONE HYDROCHLORIDE AND ACETAMINOPHEN SCH MG: 500 TABLET ORAL at 09:58

## 2020-07-30 RX ADMIN — DEXAMETHASONE SODIUM PHOSPHATE SCH MG: 4 INJECTION, SOLUTION INTRAMUSCULAR; INTRAVENOUS at 21:43

## 2020-07-30 RX ADMIN — Medication SCH: at 13:48

## 2020-07-30 RX ADMIN — FAMOTIDINE SCH MG: 20 TABLET, FILM COATED ORAL at 09:58

## 2020-07-30 RX ADMIN — GUAIFENESIN SCH MG: 600 TABLET, EXTENDED RELEASE ORAL at 21:42

## 2020-07-30 RX ADMIN — CEFTRIAXONE SCH MLS/HR: 1 INJECTION, SOLUTION INTRAVENOUS at 21:43

## 2020-07-30 RX ADMIN — REMDESIVIR SCH MLS/HR: 100 INJECTION, POWDER, LYOPHILIZED, FOR SOLUTION INTRAVENOUS at 13:47

## 2020-07-30 RX ADMIN — Medication SCH ML: at 21:43

## 2020-07-30 RX ADMIN — FLUTICASONE FUROATE, UMECLIDINIUM BROMIDE AND VILANTEROL TRIFENATATE SCH INHALER: 100; 62.5; 25 POWDER RESPIRATORY (INHALATION) at 09:59

## 2020-07-30 RX ADMIN — AZITHROMYCIN MONOHYDRATE SCH MLS/HR: 500 INJECTION, POWDER, LYOPHILIZED, FOR SOLUTION INTRAVENOUS at 21:43

## 2020-07-30 RX ADMIN — FAMOTIDINE SCH MG: 20 TABLET, FILM COATED ORAL at 21:42

## 2020-07-30 RX ADMIN — Medication PRN MG: at 21:42

## 2020-07-30 RX ADMIN — ENOXAPARIN SODIUM SCH MG: 100 INJECTION SUBCUTANEOUS at 21:42

## 2020-07-30 RX ADMIN — IPRATROPIUM BROMIDE SCH MG: 0.5 SOLUTION RESPIRATORY (INHALATION) at 01:03

## 2020-07-30 RX ADMIN — DOCUSATE SODIUM SCH MG: 100 CAPSULE, LIQUID FILLED ORAL at 09:58

## 2020-07-30 RX ADMIN — ALBUTEROL SULFATE SCH: 90 AEROSOL, METERED RESPIRATORY (INHALATION) at 13:48

## 2020-07-30 RX ADMIN — Medication SCH ML: at 05:47

## 2020-07-30 RX ADMIN — GUAIFENESIN SCH MG: 600 TABLET, EXTENDED RELEASE ORAL at 09:58

## 2020-07-30 RX ADMIN — METOPROLOL SUCCINATE SCH MG: 25 TABLET, EXTENDED RELEASE ORAL at 09:58

## 2020-07-30 NOTE — PDOC PROGRESS REPORT
Subjective


Progress Note for:: 07/30/20


Subjective:: 





Patient today states that he is feeling better in terms of his breathing.  Still

he gets short of breath when he ambulates.  We are unable to wean his nasal 

cannula overnight.  Denies fever or chills.  Attempted to eat his diet and stay 

hydrated.


Reason For Visit: 


COVID-19 PNEUMONIA,ACUTE EXACERBATION OF CHRONIC








Physical Exam


Vital Signs: 


                                        











Temp Pulse Resp BP Pulse Ox


 


 97.7 F   91   17   131/69 H  89 L


 


 07/30/20 07:26  07/30/20 07:26  07/30/20 07:26  07/30/20 07:26  07/30/20 07:26








                                 Intake & Output











 07/29/20 07/30/20 07/31/20





 06:59 06:59 06:59


 


Intake Total 3627 1140 


 


Output Total 800 1480 


 


Balance 2827 -340 


 


Weight 72.9 kg 71.5 kg 











General appearance: PRESENT: no acute distress, cooperative


Neck exam: ABSENT: JVD


Respiratory exam: PRESENT: crackles, unlabored.  ABSENT: accessory muscle use, 

retraction, tachypnea, wheezes


Cardiovascular exam: PRESENT: RRR, +S1, +S2.  ABSENT: tachycardia


GI/Abdominal exam: PRESENT: soft.  ABSENT: rebound, rigid, tenderness


Neurological exam: PRESENT: alert, awake, oriented to person, oriented to place,

oriented to time, oriented to situation





Results


Laboratory Results: 


                                        





                                 07/29/20 05:22 





                                 07/29/20 05:22 





                                        











  07/30/20





  05:17


 


C-Reactive Protein  26.8 H








                                        





07/26/20 17:40   Blood   Blood Culture (PCR) - Final





                                        











  07/26/20





  17:40


 


Troponin I  < 0.012


 


NT-Pro-B Natriuret Pep  180











Impressions: 


                                        





Chest X-Ray  07/26/20 18:17


IMPRESSION:  Worsening areas of consolidation in both lungs.


 














Assessment and Plan





- Diagnosis


(1) Pneumonia due to COVID-19 virus


Is this a current diagnosis for this admission?: Yes   


Plan: 


Severe COVID Pneumonia


Tested positive for COVID-19 on 7/16/2020.


Chest x-ray notable for bilateral pneumonia.  Currently afebrile.


Treating patient with ceftriaxone, azithromycin day 4/5, remdesivir day 4/5, 

zinc, vitamin C, vitamin D supplements.


D-dimer is elevated so patient is on therapeutic dose Lovenox


Monitor d-dimer, CRP & ESR are trending down.


Monitor CBC and electrolytes








(2) Acute respiratory failure with hypoxia


Is this a current diagnosis for this admission?: Yes   


Plan: 


Secondary to severe COVID-19 infection.  Continue IV Decadron and Remdesivir.  

Still on 5 L nasal cannula.  He could not tolerate weaning overnight.  Will try 

again to wean today.  I am okay if his SPO2 is 90-93%.  We will continue to 

monitor closely.








(3) Acute infective exacerbation of chronic obstructive airway disease


Is this a current diagnosis for this admission?: Yes   


Plan: 


COPD exacerbation.  Likely triggered by COVID pneumonia.  Not wheezing on my 

exam today.  Will continue to treat with albuterol inhalers.  Continue his 

Trelegy.  On Decadron.








(4) Gram-negative bacteremia


Is this a current diagnosis for this admission?: Yes   


Plan: 


Growing gram-negative rods in 1 blood culture bottle only.  I question if this 

is a true bacteremia both being treated with ceftriaxone.  Awaiting sensitivity 

and identification.  








(5) Coronary artery disease


Qualifiers: 


   Coronary Disease-Associated Artery/Lesion type: native artery   Native vs. 

transplanted heart: native heart   Associated angina: without angina   Qualified

Code(s): I25.10 - Atherosclerotic heart disease of native coronary artery 

without angina pectoris   


Is this a current diagnosis for this admission?: Yes   


Plan: 


Continue aspirin and metoprolol and statin








(6) Hypertension


Qualifiers: 


   Hypertension type: essential hypertension   Qualified Code(s): I10 - 

Essential (primary) hypertension   


Is this a current diagnosis for this admission?: Yes   


Plan: 


Continue home meds








- Time


Time Spent with patient: Less than 15 minutes


Anticipated Discharge Disposition: Home, Self Care


Anticipated Discharge Timeframe: uncertain

## 2020-07-31 LAB
ANION GAP SERPL CALC-SCNC: 6 MMOL/L (ref 5–19)
ANION GAP SERPL CALC-SCNC: 6 MMOL/L (ref 5–19)
BUN SERPL-MCNC: 38 MG/DL (ref 7–20)
BUN SERPL-MCNC: 38 MG/DL (ref 7–20)
CALCIUM: 9.1 MG/DL (ref 8.4–10.2)
CALCIUM: 9.2 MG/DL (ref 8.4–10.2)
CHLORIDE SERPL-SCNC: 104 MMOL/L (ref 98–107)
CHLORIDE SERPL-SCNC: 105 MMOL/L (ref 98–107)
CO2 SERPL-SCNC: 23 MMOL/L (ref 22–30)
CO2 SERPL-SCNC: 26 MMOL/L (ref 22–30)
ERYTHROCYTE [DISTWIDTH] IN BLOOD BY AUTOMATED COUNT: 13.6 % (ref 11.5–14)
GLUCOSE SERPL-MCNC: 129 MG/DL (ref 75–110)
GLUCOSE SERPL-MCNC: 134 MG/DL (ref 75–110)
HCT VFR BLD CALC: 43.6 % (ref 37.9–51)
HGB BLD-MCNC: 14.9 G/DL (ref 13.5–17)
MCH RBC QN AUTO: 32.4 PG (ref 27–33.4)
MCHC RBC AUTO-ENTMCNC: 34.3 G/DL (ref 32–36)
MCV RBC AUTO: 95 FL (ref 80–97)
PLATELET # BLD: 380 10^3/UL (ref 150–450)
POTASSIUM SERPL-SCNC: 5.2 MMOL/L (ref 3.6–5)
POTASSIUM SERPL-SCNC: 5.6 MMOL/L (ref 3.6–5)
RBC # BLD AUTO: 4.6 10^6/UL (ref 4.35–5.55)
WBC # BLD AUTO: 12.3 10^3/UL (ref 4–10.5)

## 2020-07-31 RX ADMIN — DOCUSATE SODIUM SCH MG: 100 CAPSULE, LIQUID FILLED ORAL at 09:01

## 2020-07-31 RX ADMIN — FAMOTIDINE SCH MG: 20 TABLET, FILM COATED ORAL at 09:00

## 2020-07-31 RX ADMIN — DEXAMETHASONE SODIUM PHOSPHATE SCH MG: 4 INJECTION, SOLUTION INTRAMUSCULAR; INTRAVENOUS at 13:07

## 2020-07-31 RX ADMIN — ENOXAPARIN SODIUM SCH MG: 100 INJECTION SUBCUTANEOUS at 22:14

## 2020-07-31 RX ADMIN — ALBUTEROL SULFATE SCH: 90 AEROSOL, METERED RESPIRATORY (INHALATION) at 11:01

## 2020-07-31 RX ADMIN — DOCUSATE SODIUM SCH: 100 CAPSULE, LIQUID FILLED ORAL at 17:52

## 2020-07-31 RX ADMIN — DEXAMETHASONE SODIUM PHOSPHATE SCH MG: 4 INJECTION, SOLUTION INTRAMUSCULAR; INTRAVENOUS at 05:26

## 2020-07-31 RX ADMIN — METOPROLOL SUCCINATE SCH MG: 25 TABLET, EXTENDED RELEASE ORAL at 09:01

## 2020-07-31 RX ADMIN — GUAIFENESIN SCH MG: 600 TABLET, EXTENDED RELEASE ORAL at 22:06

## 2020-07-31 RX ADMIN — DEXAMETHASONE SODIUM PHOSPHATE SCH MG: 4 INJECTION, SOLUTION INTRAMUSCULAR; INTRAVENOUS at 22:05

## 2020-07-31 RX ADMIN — ALBUTEROL SULFATE SCH: 90 AEROSOL, METERED RESPIRATORY (INHALATION) at 00:35

## 2020-07-31 RX ADMIN — OXYCODONE HYDROCHLORIDE AND ACETAMINOPHEN SCH MG: 500 TABLET ORAL at 09:00

## 2020-07-31 RX ADMIN — ATORVASTATIN CALCIUM SCH MG: 40 TABLET, FILM COATED ORAL at 22:06

## 2020-07-31 RX ADMIN — ALBUTEROL SULFATE SCH: 90 AEROSOL, METERED RESPIRATORY (INHALATION) at 05:31

## 2020-07-31 RX ADMIN — ENOXAPARIN SODIUM SCH MG: 100 INJECTION SUBCUTANEOUS at 09:01

## 2020-07-31 RX ADMIN — Medication SCH ML: at 22:07

## 2020-07-31 RX ADMIN — ALBUTEROL SULFATE SCH: 90 AEROSOL, METERED RESPIRATORY (INHALATION) at 17:52

## 2020-07-31 RX ADMIN — GUAIFENESIN SCH MG: 600 TABLET, EXTENDED RELEASE ORAL at 09:01

## 2020-07-31 RX ADMIN — Medication SCH: at 13:09

## 2020-07-31 RX ADMIN — Medication SCH ML: at 05:26

## 2020-07-31 RX ADMIN — FAMOTIDINE SCH MG: 20 TABLET, FILM COATED ORAL at 22:07

## 2020-07-31 RX ADMIN — FLUTICASONE FUROATE, UMECLIDINIUM BROMIDE AND VILANTEROL TRIFENATATE SCH INHALER: 100; 62.5; 25 POWDER RESPIRATORY (INHALATION) at 09:01

## 2020-07-31 RX ADMIN — Medication SCH MG: at 09:00

## 2020-07-31 RX ADMIN — CEFTRIAXONE SCH MLS/HR: 1 INJECTION, SOLUTION INTRAVENOUS at 22:06

## 2020-07-31 RX ADMIN — ASPIRIN SCH MG: 81 TABLET, COATED ORAL at 09:01

## 2020-07-31 RX ADMIN — VITAMIN D, TAB 1000IU (100/BT) SCH UNIT: 25 TAB at 09:00

## 2020-07-31 NOTE — PDOC PROGRESS REPORT
Subjective


Progress Note for:: 07/31/20


Subjective:: 





Patient was significantly tachycardic this morning.  However at time of 

encounter this morning he was fully alert and oriented and in no apparent 

confusion.  He denied any shortness of breath at rest but still notably gets 

short of breath when ambulating as reported by staff.  He denied any diarrhea, 

nausea or vomiting.  He states he has been eating adequately.





Later this afternoon around 4 PM, I was notified by nursing staff that patient 

seemed confused this afternoon and was pulling out his IV and his nasal cannula.

 However he was complaining of anything.  Noted to be moving his extremities 

well.  Vitals at the time of the event were all stable and he is 95% on 4 L 

nasal cannula at the time..


Reason For Visit: 


COVID-19 PNEUMONIA,ACUTE EXACERBATION OF CHRONIC








Physical Exam


Vital Signs: 


                                        











Temp Pulse Resp BP Pulse Ox


 


 97.4 F   143 H  16   126/74 H  91 L


 


 07/31/20 13:06  07/31/20 13:06  07/31/20 13:06  07/31/20 13:06  07/31/20 13:06








                                 Intake & Output











 07/30/20 07/31/20 08/01/20





 06:59 06:59 06:59


 


Intake Total 1390 1136 1000


 


Output Total 1480 400 


 


Balance -90 736 1000


 


Weight 71.5 kg 72.2 kg 











General appearance: PRESENT: no acute distress, cooperative


Neck exam: ABSENT: JVD


Respiratory exam: PRESENT: clear to auscultation loly, symmetrical, unlabored.  

ABSENT: tachypnea, wheezes


Cardiovascular exam: PRESENT: +S1, +S2, tachycardia.  ABSENT: irregular rhythm


GI/Abdominal exam: PRESENT: soft.  ABSENT: rebound, rigid, tenderness


Neurological exam: PRESENT: alert, awake


Psychiatric exam: ABSENT: agitated, anxious


Focused psych exam: ABSENT: pressured speech


Skin exam: ABSENT: jaundice





Results


Laboratory Results: 


                                        





                                 07/31/20 05:21 





                                 07/31/20 05:21 





                                        











  07/31/20 07/31/20





  05:21 05:21


 


WBC  12.3 H 


 


RBC  4.60 


 


Hgb  14.9 


 


Hct  43.6 


 


MCV  95 


 


MCH  32.4 


 


MCHC  34.3 


 


RDW  13.6 


 


Plt Count  380 


 


Sodium   135.7 L


 


Potassium   5.2 H


 


Chloride   104


 


Carbon Dioxide   26


 


Anion Gap   6


 


BUN   38 H


 


Creatinine   1.27 H


 


Est GFR (African Amer)   > 60


 


Glucose   134 H


 


Calcium   9.1


 


Magnesium   2.4 H








                                        





07/26/20 17:40   Blood   Blood Culture (PCR) - Final





                                        











  07/26/20





  17:40


 


Troponin I  < 0.012


 


NT-Pro-B Natriuret Pep  180











Impressions: 


                                        





Chest X-Ray  07/26/20 18:17


IMPRESSION:  Worsening areas of consolidation in both lungs.


 














Assessment and Plan





- Diagnosis


(1) Pneumonia due to COVID-19 virus


Is this a current diagnosis for this admission?: Yes   


Plan: 


Severe COVID Pneumonia


Tested positive for COVID-19 on 7/16/2020.


Chest x-ray notable for bilateral pneumonia.  Currently afebrile.


He has completed 5 days of azithromycin and 5 days of Remdesivir. Continue zinc,

vitamin C, vitamin D supplements.


D-dimer is elevated so patient is on therapeutic dose Lovenox


Monitor d-dimer, CRP & ESR 


Monitor CBC and electrolytes.  








(2) Acute respiratory failure with hypoxia


Is this a current diagnosis for this admission?: Yes   


Plan: 


Secondary to severe COVID-19 infection.  Continue IV Decadron day 6. Completed 5

days of Remdesivir.  Able to wean patient down to 4 L nasal cannula today with 

saturation in the low to mid 90s.  I am okay if his SPO2 is 90-93%.  We will 

continue to monitor closely on continuous pulse ox.








(3) Sinus tachycardia


Is this a current diagnosis for this admission?: Yes   


Plan: 


Noted to be tachycardic into the 140s this morning.  EKG was obtained which 

showed sinus tachycardia with prolonged QTC.  QTC prolongation is likely 

secondary to azithromycin which was completed yesterday night.


We will monitor on telemetry.  Administered 750 cc normal saline bolus.  Also 

received some Lopressor.  Most recent heart rate has normalized into the 80s.  

Will monitor closely.








(4) Acute metabolic encephalopathy


Is this a current diagnosis for this admission?: Yes   


Plan: 


His episode of encephalopathy this afternoon is unusual for him.  Highly suspect

this to be acute delirium given that he is elderly and has been in a closed room

on isolation for the past few days and he is severely sick with COVID-19.


We will use redirection strategies.  We will keep the window blinds open.  Vital

signs at this time are all stable and hypoxia is unlikely to be playing a role 

here as he is on 4 L nc and satting 95% currently.  We will try to avoid using 

restraints of pharmacotherapy except if needed if he starts becoming harmful to 

himself.


Will check metabolic panel to reevaluate hyperkalemia and elevated creatinine, 

check accucheck.








(5) Acute infective exacerbation of chronic obstructive airway disease


Is this a current diagnosis for this admission?: Yes   


Plan: 


COPD exacerbation has resolved at this time. Will continue to treat with 

albuterol inhalers.  Continue his Trelegy.  








(6) Gram-negative bacteremia


Is this a current diagnosis for this admission?: Yes   


Plan: 


Growing gram-negative rods in 1 blood culture bottle only. I question if this is

a true bacteremia both being treated with ceftriaxone.  Awaiting sensitivity and

identification.  








(7) Coronary artery disease


Qualifiers: 


   Coronary Disease-Associated Artery/Lesion type: native artery   Native vs. t

ransplanted heart: native heart   Associated angina: without angina   Qualified 

Code(s): I25.10 - Atherosclerotic heart disease of native coronary artery 

without angina pectoris   


Is this a current diagnosis for this admission?: Yes   


Plan: 


Continue aspirin and metoprolol and statin








(8) Hypertension


Qualifiers: 


   Hypertension type: essential hypertension   Qualified Code(s): I10 - 

Essential (primary) hypertension   


Is this a current diagnosis for this admission?: Yes   


Plan: 


Continue home meds








- Time


Time Spent with patient: 15-24 minutes


Anticipated Discharge Disposition: Home, Self Care


Anticipated Discharge Timeframe: within 72 hours

## 2020-07-31 NOTE — EKG REPORT
SEVERITY:- ABNORMAL ECG -

SUPRAVENTRICULAR TACHYCARDIA

BORDERLINE PROLONGED QT INTERVAL

:

Confirmed by: Diann Coreas MD 31-Jul-2020 23:12:55

## 2020-08-01 LAB
ANION GAP SERPL CALC-SCNC: 8 MMOL/L (ref 5–19)
BUN SERPL-MCNC: 40 MG/DL (ref 7–20)
CALCIUM: 9.2 MG/DL (ref 8.4–10.2)
CHLORIDE SERPL-SCNC: 104 MMOL/L (ref 98–107)
CO2 SERPL-SCNC: 24 MMOL/L (ref 22–30)
CRP SERPL-MCNC: 14.4 MG/L (ref ?–10)
GLUCOSE SERPL-MCNC: 128 MG/DL (ref 75–110)
POTASSIUM SERPL-SCNC: 5.4 MMOL/L (ref 3.6–5)

## 2020-08-01 RX ADMIN — CEFTRIAXONE SCH MLS/HR: 1 INJECTION, SOLUTION INTRAVENOUS at 21:35

## 2020-08-01 RX ADMIN — ALBUTEROL SULFATE SCH PUFF: 90 AEROSOL, METERED RESPIRATORY (INHALATION) at 17:10

## 2020-08-01 RX ADMIN — ALBUTEROL SULFATE SCH PUFF: 90 AEROSOL, METERED RESPIRATORY (INHALATION) at 11:08

## 2020-08-01 RX ADMIN — FAMOTIDINE SCH MG: 20 TABLET, FILM COATED ORAL at 21:36

## 2020-08-01 RX ADMIN — ENOXAPARIN SODIUM SCH MG: 100 INJECTION SUBCUTANEOUS at 09:49

## 2020-08-01 RX ADMIN — DOCUSATE SODIUM SCH MG: 100 CAPSULE, LIQUID FILLED ORAL at 17:10

## 2020-08-01 RX ADMIN — ATORVASTATIN CALCIUM SCH MG: 40 TABLET, FILM COATED ORAL at 21:37

## 2020-08-01 RX ADMIN — Medication SCH MG: at 09:48

## 2020-08-01 RX ADMIN — DEXAMETHASONE SODIUM PHOSPHATE SCH MG: 4 INJECTION, SOLUTION INTRAMUSCULAR; INTRAVENOUS at 13:36

## 2020-08-01 RX ADMIN — VITAMIN D, TAB 1000IU (100/BT) SCH UNIT: 25 TAB at 09:49

## 2020-08-01 RX ADMIN — FAMOTIDINE SCH MG: 20 TABLET, FILM COATED ORAL at 09:49

## 2020-08-01 RX ADMIN — ALBUTEROL SULFATE SCH PUFF: 90 AEROSOL, METERED RESPIRATORY (INHALATION) at 00:23

## 2020-08-01 RX ADMIN — Medication SCH ML: at 13:36

## 2020-08-01 RX ADMIN — Medication SCH ML: at 21:38

## 2020-08-01 RX ADMIN — Medication PRN MG: at 21:36

## 2020-08-01 RX ADMIN — ASPIRIN SCH MG: 81 TABLET, COATED ORAL at 09:48

## 2020-08-01 RX ADMIN — FLUTICASONE FUROATE, UMECLIDINIUM BROMIDE AND VILANTEROL TRIFENATATE SCH INHALER: 100; 62.5; 25 POWDER RESPIRATORY (INHALATION) at 09:49

## 2020-08-01 RX ADMIN — GUAIFENESIN SCH MG: 600 TABLET, EXTENDED RELEASE ORAL at 09:49

## 2020-08-01 RX ADMIN — OXYCODONE HYDROCHLORIDE AND ACETAMINOPHEN SCH MG: 500 TABLET ORAL at 09:49

## 2020-08-01 RX ADMIN — METOPROLOL SUCCINATE SCH MG: 50 TABLET, EXTENDED RELEASE ORAL at 09:48

## 2020-08-01 RX ADMIN — DOCUSATE SODIUM SCH MG: 100 CAPSULE, LIQUID FILLED ORAL at 09:48

## 2020-08-01 RX ADMIN — ENOXAPARIN SODIUM SCH MG: 100 INJECTION SUBCUTANEOUS at 21:38

## 2020-08-01 RX ADMIN — Medication SCH ML: at 06:02

## 2020-08-01 RX ADMIN — ALBUTEROL SULFATE SCH PUFF: 90 AEROSOL, METERED RESPIRATORY (INHALATION) at 06:09

## 2020-08-01 RX ADMIN — DEXAMETHASONE SODIUM PHOSPHATE SCH MG: 4 INJECTION, SOLUTION INTRAMUSCULAR; INTRAVENOUS at 06:00

## 2020-08-01 RX ADMIN — DEXAMETHASONE SODIUM PHOSPHATE SCH MG: 4 INJECTION, SOLUTION INTRAMUSCULAR; INTRAVENOUS at 21:37

## 2020-08-01 NOTE — PDOC PROGRESS REPORT
Subjective


Progress Note for:: 08/01/20


Subjective:: 





Patient is doing better today.  He has not had any recurrence of his delirium 

episode yesterday.  His mental status is back to baseline.  He is fully alert 

and oriented at this time.  He states that he is breathing better.  Denies any 

chest pain.  Eating well.


Reason For Visit: 


COVID-19 PNEUMONIA,ACUTE EXACERBATION OF CHRONIC








Physical Exam


Vital Signs: 


                                        











Temp Pulse Resp BP Pulse Ox


 


 97.5 F   89   16   136/73 H  94 


 


 08/01/20 09:05  08/01/20 09:05  08/01/20 09:05  08/01/20 09:05  08/01/20 09:05








                                 Intake & Output











 07/31/20 08/01/20 08/02/20





 06:59 06:59 06:59


 


Intake Total 1136 1620 


 


Output Total 400  


 


Balance 736 1620 


 


Weight 72.2 kg 78.7 kg 











General appearance: PRESENT: no acute distress, cooperative


Neck exam: ABSENT: JVD


Respiratory exam: PRESENT: symmetrical, unlabored.  ABSENT: accessory muscle 

use, retraction, tachypnea


Cardiovascular exam: PRESENT: RRR.  ABSENT: bradycardia, irregular rhythm, 

tachycardia


GI/Abdominal exam: PRESENT: soft.  ABSENT: distended, rebound, rigid, tenderness


Neurological exam: PRESENT: alert, awake, oriented to person, oriented to place,

oriented to time


Psychiatric exam: ABSENT: agitated, anxious


Focused psych exam: ABSENT: pressured speech


Skin exam: PRESENT: other - Mildly flushed





Results


Laboratory Results: 


                                        





                                 07/31/20 05:21 





                                 08/01/20 05:38 





                                        











  07/31/20 08/01/20 08/01/20





  17:28 05:38 05:38


 


Sodium  134.3 L   135.7 L


 


Potassium  5.6 H   5.4 H


 


Chloride  105   104


 


Carbon Dioxide  23   24


 


Anion Gap  6   8


 


BUN  38 H   40 H


 


Creatinine  1.14   1.27 H


 


Est GFR ( Amer)  > 60   > 60


 


Glucose  129 H   128 H


 


Calcium  9.2   9.2


 


Magnesium   2.5 H 


 


C-Reactive Protein   14.4 H 








                                        





07/26/20 21:50   Blood   Blood Culture - Final


                            NO GROWTH IN 5 DAYS


07/26/20 17:40   Blood   Blood Culture (PCR) - Final





                                        











  07/26/20 07/31/20





  17:40 17:28


 


Troponin I  < 0.012  < 0.012


 


NT-Pro-B Natriuret Pep  180 











Impressions: 


                                        





Chest X-Ray  07/26/20 18:17


IMPRESSION:  Worsening areas of consolidation in both lungs.


 














Assessment and Plan





- Diagnosis


(1) Pneumonia due to COVID-19 virus


Is this a current diagnosis for this admission?: Yes   


Plan: 


Severe COVID Pneumonia


Tested positive for COVID-19 on 7/16/2020.


Chest x-ray notable for bilateral pneumonia.  Currently afebrile.


He has completed 5 days of azithromycin and 5 days of Remdesivir. Continue zinc,

vitamin C, vitamin D supplements.


D-dimer is elevated so patient is on therapeutic dose Lovenox


Continue to trend d-dimer, CRP & ESR 


Monitor CBC and electrolytes.  








(2) Acute respiratory failure with hypoxia


Is this a current diagnosis for this admission?: Yes   


Plan: 


Secondary to severe COVID-19 infection.  Continue IV Decadron day 7. Completed 5

days of Remdesivir.  SPO2 was adequate on 4 L.  We will try to see if we can 

wean patient down over the next 24 hours to 2 L.  I am okay if his SPO2 is 90-93

%.  We will continue to monitor closely on continuous pulse ox.








(3) Acute metabolic encephalopathy


Is this a current diagnosis for this admission?: Yes   


Plan: 


Likely secondary to acute delirium given elderly age, acute severe sickness and 

being in a closed room on isolation for the past few days.


Seems to have resolved at this time.  We will continue reorientation techniques,

keeping the windows open, and other supportive techniques to help prevent 

recurrence.








(4) Acute infective exacerbation of chronic obstructive airway disease


Is this a current diagnosis for this admission?: Yes   


Plan: 


COPD exacerbation has resolved at this time. Will continue to treat with 

albuterol inhalers.  Continue his Trelegy.  








(5) Gram-negative bacteremia


Is this a current diagnosis for this admission?: Yes   


Plan: 


Growing gram-negative rods in 1 blood culture bottle only. I question if this is

a true bacteremia both being treated with ceftriaxone.  Awaiting sensitivity and

identification.  Unfortunately, the lab is unable to identify the organisms at 

this time and states that they will have to send it out and may take 2 weeks.  

We will treat empirically with cephalosporin.  Repeat blood cultures have been 

negative.








(6) Coronary artery disease


Qualifiers: 


   Coronary Disease-Associated Artery/Lesion type: native artery   Native vs. 

transplanted heart: native heart   Associated angina: without angina   Qualified

Code(s): I25.10 - Atherosclerotic heart disease of native coronary artery 

without angina pectoris   


Is this a current diagnosis for this admission?: Yes   


Plan: 


Continue aspirin and metoprolol and statin








(7) Hyperkalemia


Is this a current diagnosis for this admission?: Yes   


Plan: 


Patient hyperkalemia persists despite treatment with fluids, Lasix and Veltassa 

yesterday.  I will give patient some IV fluids as his creatinine also has a mild

bump indicating of some level of dehydration.  I have reviewed patient's 

medications and only current meds that could cause this is Lovenox which is not 

typical either.  However I strongly believe that his hyperkalemia was secondary 

to Remdesivir.  I have done review of medical literature which indicates some 

instances of hyperkalemia secondary to this medication. We will continue to 

monitor BMP.








(8) Hypertension


Qualifiers: 


   Hypertension type: essential hypertension   Qualified Code(s): I10 - 

Essential (primary) hypertension   


Is this a current diagnosis for this admission?: Yes   


Plan: 


Continue home meds








(9) Paroxysmal SVT (supraventricular tachycardia)


Is this a current diagnosis for this admission?: Yes   


Plan: 


Patient's episode yesterday actually showed SVT on EKG.  Resolved after 

receiving IV fluids and a small dose of Lopressor.  Continue cardiac monitoring.








- Time


Time Spent with patient: Less than 15 minutes


Anticipated Discharge Disposition: Home, Self Care


Anticipated Discharge Timeframe: within 48 hours

## 2020-08-02 LAB
ANION GAP SERPL CALC-SCNC: 7 MMOL/L (ref 5–19)
BUN SERPL-MCNC: 40 MG/DL (ref 7–20)
CALCIUM: 8.6 MG/DL (ref 8.4–10.2)
CHLORIDE SERPL-SCNC: 109 MMOL/L (ref 98–107)
CO2 SERPL-SCNC: 21 MMOL/L (ref 22–30)
CRP SERPL-MCNC: 10.5 MG/L (ref ?–10)
ERYTHROCYTE [DISTWIDTH] IN BLOOD BY AUTOMATED COUNT: 13.8 % (ref 11.5–14)
ERYTHROCYTE [SEDIMENTATION RATE] IN BLOOD: 18 MM/HR (ref 0–20)
GLUCOSE SERPL-MCNC: 134 MG/DL (ref 75–110)
HCT VFR BLD CALC: 43.8 % (ref 37.9–51)
HGB BLD-MCNC: 14.8 G/DL (ref 13.5–17)
MCH RBC QN AUTO: 32.5 PG (ref 27–33.4)
MCHC RBC AUTO-ENTMCNC: 33.8 G/DL (ref 32–36)
MCV RBC AUTO: 96 FL (ref 80–97)
PLATELET # BLD: 373 10^3/UL (ref 150–450)
POTASSIUM SERPL-SCNC: 4.6 MMOL/L (ref 3.6–5)
RBC # BLD AUTO: 4.55 10^6/UL (ref 4.35–5.55)
WBC # BLD AUTO: 13 10^3/UL (ref 4–10.5)

## 2020-08-02 RX ADMIN — FAMOTIDINE SCH MG: 20 TABLET, FILM COATED ORAL at 10:18

## 2020-08-02 RX ADMIN — METOPROLOL SUCCINATE SCH MG: 50 TABLET, EXTENDED RELEASE ORAL at 10:18

## 2020-08-02 RX ADMIN — DEXAMETHASONE SODIUM PHOSPHATE SCH MG: 4 INJECTION, SOLUTION INTRAMUSCULAR; INTRAVENOUS at 21:53

## 2020-08-02 RX ADMIN — CEFTRIAXONE SCH MLS/HR: 1 INJECTION, SOLUTION INTRAVENOUS at 21:52

## 2020-08-02 RX ADMIN — ENOXAPARIN SODIUM SCH MG: 100 INJECTION SUBCUTANEOUS at 21:53

## 2020-08-02 RX ADMIN — VITAMIN D, TAB 1000IU (100/BT) SCH UNIT: 25 TAB at 10:18

## 2020-08-02 RX ADMIN — DOCUSATE SODIUM SCH MG: 100 CAPSULE, LIQUID FILLED ORAL at 17:39

## 2020-08-02 RX ADMIN — Medication SCH ML: at 06:05

## 2020-08-02 RX ADMIN — ALBUTEROL SULFATE SCH PUFF: 90 AEROSOL, METERED RESPIRATORY (INHALATION) at 00:08

## 2020-08-02 RX ADMIN — FAMOTIDINE SCH MG: 20 TABLET, FILM COATED ORAL at 21:53

## 2020-08-02 RX ADMIN — ATORVASTATIN CALCIUM SCH MG: 40 TABLET, FILM COATED ORAL at 21:53

## 2020-08-02 RX ADMIN — FLUTICASONE FUROATE, UMECLIDINIUM BROMIDE AND VILANTEROL TRIFENATATE SCH INHALER: 100; 62.5; 25 POWDER RESPIRATORY (INHALATION) at 10:19

## 2020-08-02 RX ADMIN — ENOXAPARIN SODIUM SCH MG: 100 INJECTION SUBCUTANEOUS at 10:18

## 2020-08-02 RX ADMIN — Medication SCH MG: at 10:18

## 2020-08-02 RX ADMIN — ALBUTEROL SULFATE SCH PUFF: 90 AEROSOL, METERED RESPIRATORY (INHALATION) at 12:16

## 2020-08-02 RX ADMIN — DOCUSATE SODIUM SCH MG: 100 CAPSULE, LIQUID FILLED ORAL at 10:18

## 2020-08-02 RX ADMIN — ALBUTEROL SULFATE SCH PUFF: 90 AEROSOL, METERED RESPIRATORY (INHALATION) at 06:05

## 2020-08-02 RX ADMIN — Medication SCH ML: at 14:05

## 2020-08-02 RX ADMIN — Medication SCH ML: at 21:54

## 2020-08-02 RX ADMIN — Medication PRN MG: at 21:53

## 2020-08-02 RX ADMIN — DEXAMETHASONE SODIUM PHOSPHATE SCH MG: 4 INJECTION, SOLUTION INTRAMUSCULAR; INTRAVENOUS at 14:05

## 2020-08-02 RX ADMIN — OXYCODONE HYDROCHLORIDE AND ACETAMINOPHEN SCH MG: 500 TABLET ORAL at 10:18

## 2020-08-02 RX ADMIN — DEXAMETHASONE SODIUM PHOSPHATE SCH MG: 4 INJECTION, SOLUTION INTRAMUSCULAR; INTRAVENOUS at 06:04

## 2020-08-02 RX ADMIN — ASPIRIN SCH MG: 81 TABLET, COATED ORAL at 10:18

## 2020-08-02 NOTE — PDOC PROGRESS REPORT
Subjective


Progress Note for:: 08/02/20


Subjective:: 





Patient's has no complaints today.  He states he is breathing well.  He is 

eating his meals comfortably.  Does not appear confused today.


Reason For Visit: 


COVID-19 PNEUMONIA,ACUTE EXACERBATION OF CHRONIC








Physical Exam


Vital Signs: 


                                        











Temp Pulse Resp BP Pulse Ox


 


 98.1 F   85   24 H  104/57 L  93 


 


 08/02/20 10:00  08/02/20 10:00  08/02/20 10:00  08/02/20 10:00  08/02/20 10:00








                                 Intake & Output











 08/01/20 08/02/20 08/03/20





 06:59 06:59 06:59


 


Intake Total 1557 455 2332


 


Output Total  1450 


 


Balance 1620 -988 1050


 


Weight 78.7 kg 79.9 kg 











General appearance: PRESENT: no acute distress, cooperative


Neck exam: ABSENT: JVD


Respiratory exam: PRESENT: unlabored.  ABSENT: accessory muscle use, retraction,

tachypnea


Cardiovascular exam: PRESENT: RRR.  ABSENT: tachycardia


GI/Abdominal exam: ABSENT: distended


Neurological exam: PRESENT: alert, awake, oriented to person, oriented to place,

other - Does not appear confused.  Fully conversational and appropriately so


Psychiatric exam: ABSENT: agitated, anxious


Focused psych exam: ABSENT: pressured speech


Skin exam: ABSENT: jaundice


Additional comments: 





Physical exam done with the aid of tele





Results


Laboratory Results: 


                                        





                                 08/02/20 06:25 





                                 08/02/20 06:25 





                                        











  08/02/20 08/02/20





  06:25 06:25


 


WBC  13.0 H 


 


RBC  4.55 


 


Hgb  14.8 


 


Hct  43.8 


 


MCV  96 


 


MCH  32.5 


 


MCHC  33.8 


 


RDW  13.8 


 


Plt Count  373 


 


Sodium   137.2


 


Potassium   4.6


 


Chloride   109 H


 


Carbon Dioxide   21 L


 


Anion Gap   7


 


BUN   40 H


 


Creatinine   1.26 H


 


Est GFR ([place] Amer)   > 60


 


Glucose   134 H


 


Calcium   8.6


 


C-Reactive Protein   10.5 H








                                        





07/26/20 21:50   Blood   Blood Culture - Final


                            NO GROWTH IN 5 DAYS


07/26/20 17:40   Blood   Blood Culture (PCR) - Final


07/26/20 17:40   Blood   Blood Culture - Final


                            Morax.(Branhamella)Catarrhalis


                            Citrobacter Amalonaticus





                                        











  07/26/20 07/31/20





  17:40 17:28


 


Troponin I  < 0.012  < 0.012


 


NT-Pro-B Natriuret Pep  180 











Impressions: 


                                        





Chest X-Ray  08/02/20 06:00


IMPRESSION:  NO SIGNIFICANT INTERVAL CHANGE.


 














Assessment and Plan





- Diagnosis


(1) Pneumonia due to COVID-19 virus


Is this a current diagnosis for this admission?: Yes   


Plan: 


Severe COVID Pneumonia


Tested positive for COVID-19 on 7/16/2020.


Chest x-ray notable for bilateral pneumonia.  Currently afebrile.


He has completed 5 days of azithromycin and 5 days of Remdesivir. Continue zinc,

vitamin C, vitamin D supplements.


D-dimer is elevated so patient is on therapeutic dose Lovenox


Continue to trend d-dimer, CRP & ESR 


Monitor CBC and electrolytes.  








(2) Acute respiratory failure with hypoxia


Is this a current diagnosis for this admission?: Yes   


Plan: 


Secondary to severe COVID-19 infection.  Continue IV Decadron day 7. Completed 5

days of Remdesivir.  SPO2 was adequate on 4 L.  We will try to see if we can 

wean patient down over the next 24 hours to 2 L.  I am okay if his SPO2 is 90-

93%.  We will continue to monitor closely on continuous pulse ox.  Chest x-ray 

today shows no significant interval change.








(3) Acute metabolic encephalopathy


Is this a current diagnosis for this admission?: Yes   


Plan: 


Likely secondary to acute delirium given elderly age, acute severe sickness and 

being in a closed room on isolation for the past few days.


Currently resolved.  Continue supportive techniques and reorientation tech

niques.  Keep window blinds open.








(4) Acute infective exacerbation of chronic obstructive airway disease


Is this a current diagnosis for this admission?: Yes   


Plan: 


COPD exacerbation has resolved at this time.  Albuterol as needed.  Continue 

Trelegy.  








(5) Gram-negative bacteremia


Is this a current diagnosis for this admission?: Yes   


Plan: 


Blood culture growing 2 different gram-negative rods in 1 blood culture bottle 

only. I question if this is a true bacteremia but being treated with ceftriaxone

nonetheless.  Identification is come back as Moraxella and Citrobacter both 

sensitive to ceftriaxone.  Today will complete day 8/8 of treatment for 

bacteremia.  Repeat blood cultures have been negative so far.








(6) Coronary artery disease


Qualifiers: 


   Coronary Disease-Associated Artery/Lesion type: native artery   Native vs. 

transplanted heart: native heart   Associated angina: without angina   Qualified

Code(s): I25.10 - Atherosclerotic heart disease of native coronary artery 

without angina pectoris   


Is this a current diagnosis for this admission?: Yes   


Plan: 


Continue aspirin and metoprolol and statin








(7) Hyperkalemia


Is this a current diagnosis for this admission?: Yes   


Plan: 


I strongly believe that his hyperkalemia was secondary to Remdesivir.  I have 

done review of medical literature which indicates some instances of hyperkalemia

secondary to this medication.  However BMP today shows normalization of pota

ssium level.  Will monitor.








(8) Hypertension


Qualifiers: 


   Hypertension type: essential hypertension   Qualified Code(s): I10 - [place]

tial (primary) hypertension   


Is this a current diagnosis for this admission?: Yes   


Plan: 


Continue home meds








(9) Paroxysmal SVT (supraventricular tachycardia)


Is this a current diagnosis for this admission?: Yes   


Plan: 


Resolved at this point.  Continue beta-blocker.  Follow-up outpatient with 

cardiology.








- Time


Time Spent with patient: Less than 15 minutes


Anticipated Discharge Disposition: Home, Self Care


Anticipated Discharge Timeframe: within 48 hours

## 2020-08-02 NOTE — RADIOLOGY REPORT (SQ)
EXAM DESCRIPTION:  CHEST SINGLE VIEW



IMAGES COMPLETED DATE/TIME:  8/2/2020 7:26 am



REASON FOR STUDY:  persistent hypoxia. COVID pneumonia. reassessment



COMPARISON:  7/26/2020.



EXAM PARAMETERS:  NUMBER OF VIEWS: One view.

TECHNIQUE: Single frontal radiographic view of the chest acquired.

RADIATION DOSE: NA

LIMITATIONS: None.



FINDINGS:  LUNGS AND PLEURA: Scattered bilateral airspace disease, more prominent in the right mid víctor
ng and left lung base.  Overall no significant change.  No large pleural effusions.

MEDIASTINUM AND HILAR STRUCTURES: No masses.  Contour normal.

HEART AND VASCULAR STRUCTURES: Heart normal in size.  Normal vasculature.

BONES: No acute findings.

HARDWARE: None in the chest.

OTHER: No other significant finding.



IMPRESSION:  NO SIGNIFICANT INTERVAL CHANGE.



TECHNICAL DOCUMENTATION:  JOB ID:  [id number]

 2011 [employer]- All Rights Reserved



Reading location - IP/workstation name: [email or web address]

## 2020-08-03 RX ADMIN — FAMOTIDINE SCH MG: 20 TABLET, FILM COATED ORAL at 10:31

## 2020-08-03 RX ADMIN — OXYCODONE HYDROCHLORIDE AND ACETAMINOPHEN SCH MG: 500 TABLET ORAL at 10:30

## 2020-08-03 RX ADMIN — ENOXAPARIN SODIUM SCH MG: 80 INJECTION SUBCUTANEOUS at 10:29

## 2020-08-03 RX ADMIN — DEXAMETHASONE SODIUM PHOSPHATE SCH MG: 4 INJECTION, SOLUTION INTRAMUSCULAR; INTRAVENOUS at 21:27

## 2020-08-03 RX ADMIN — FLUTICASONE FUROATE, UMECLIDINIUM BROMIDE AND VILANTEROL TRIFENATATE SCH INHALER: 100; 62.5; 25 POWDER RESPIRATORY (INHALATION) at 10:31

## 2020-08-03 RX ADMIN — ENOXAPARIN SODIUM SCH MG: 80 INJECTION SUBCUTANEOUS at 21:26

## 2020-08-03 RX ADMIN — ATORVASTATIN CALCIUM SCH MG: 40 TABLET, FILM COATED ORAL at 21:27

## 2020-08-03 RX ADMIN — DOCUSATE SODIUM SCH MG: 100 CAPSULE, LIQUID FILLED ORAL at 10:30

## 2020-08-03 RX ADMIN — ASPIRIN SCH MG: 81 TABLET, COATED ORAL at 10:30

## 2020-08-03 RX ADMIN — Medication SCH ML: at 05:44

## 2020-08-03 RX ADMIN — DEXAMETHASONE SODIUM PHOSPHATE SCH MG: 4 INJECTION, SOLUTION INTRAMUSCULAR; INTRAVENOUS at 14:10

## 2020-08-03 RX ADMIN — Medication SCH MG: at 10:30

## 2020-08-03 RX ADMIN — VITAMIN D, TAB 1000IU (100/BT) SCH UNIT: 25 TAB at 10:30

## 2020-08-03 RX ADMIN — Medication SCH ML: at 21:27

## 2020-08-03 RX ADMIN — DEXAMETHASONE SODIUM PHOSPHATE SCH MG: 4 INJECTION, SOLUTION INTRAMUSCULAR; INTRAVENOUS at 05:44

## 2020-08-03 RX ADMIN — Medication PRN MG: at 21:27

## 2020-08-03 RX ADMIN — Medication SCH ML: at 14:10

## 2020-08-03 RX ADMIN — METOPROLOL SUCCINATE SCH MG: 50 TABLET, EXTENDED RELEASE ORAL at 10:30

## 2020-08-03 RX ADMIN — DOCUSATE SODIUM SCH MG: 100 CAPSULE, LIQUID FILLED ORAL at 18:30

## 2020-08-03 RX ADMIN — FAMOTIDINE SCH MG: 20 TABLET, FILM COATED ORAL at 21:32

## 2020-08-03 NOTE — PDOC PROGRESS REPORT
Subjective


Progress Note for:: 08/03/20


Subjective:: 





No issues today.  Patient feels well.


Reason For Visit: 


COVID-19 PNEUMONIA,ACUTE EXACERBATION OF CHRONIC








Physical Exam


Vital Signs: 


                                        











Temp Pulse Resp BP Pulse Ox


 


 97.3 F   72   20   130/66 H  93 


 


 08/03/20 02:54  08/03/20 07:00  08/03/20 08:00  08/03/20 02:54  08/03/20 08:00








                                 Intake & Output











 08/02/20 08/03/20 08/04/20





 06:59 06:59 06:59


 


Intake Total 462 1337 


 


Output Total 1450 450 


 


Balance -988 887 


 


Weight 79.9 kg 79.2 kg 











General appearance: PRESENT: cooperative


Cardiovascular exam: PRESENT: RRR.  ABSENT: tachycardia


Neurological exam: PRESENT: alert, awake


Focused psych exam: ABSENT: pressured speech


Additional comments: 


Physical exam done today with the aid of telecom.  








Results


Laboratory Results: 


                                        





                                 08/02/20 06:25 





                                 08/02/20 06:25 





                                        











  07/26/20 07/31/20





  17:40 17:28


 


Troponin I  < 0.012  < 0.012


 


NT-Pro-B Natriuret Pep  180 











Impressions: 


                                        





Chest X-Ray  08/02/20 06:00


IMPRESSION:  NO SIGNIFICANT INTERVAL CHANGE.


 














Assessment and Plan





- Diagnosis


(1) Pneumonia due to COVID-19 virus


Is this a current diagnosis for this admission?: Yes   


Plan: 


Presented with severe COVID Pneumonia but currently doing well.


Tested positive for COVID-19 on 7/16/2020.


Chest x-ray notable for bilateral pneumonia.  Currently afebrile.


He has completed 5 days of azithromycin and 5 days of Remdesivir. 


Dexamethasone day 9/10 given hypoxia.


On zinc, vitamin C, vitamin D supplements.


D-dimer is elevated so patient is on therapeutic dose Lovenox


Trending d-dimer, 


CRP & ESR are pretty much normal at this point.


Monitor CBC and electrolytes.  








(2) Acute respiratory failure with hypoxia


Is this a current diagnosis for this admission?: Yes   


Plan: 


Secondary to severe COVID-19 infection.  Today patient is down to 3.5 L nasal 

cannula.  This is the limiting factor currently for patient's discharge.


If patient is able to be weaned down to 2 L nasal cannula today and stay on 2 L 

for most of today, patient can be discharged tomorrow with home oxygen.








(3) Acute metabolic encephalopathy


Is this a current diagnosis for this admission?: Yes   


Plan: 


2/2 acute delirium given elderly age, acute severe sickness and being in a 

closed room on isolation for the past few days.


Remains resolved.  Continue supportive techniques and reorientation techniques. 

Keep window blinds open.








(4) Acute infective exacerbation of chronic obstructive airway disease


Is this a current diagnosis for this admission?: Yes   


Plan: 


COPD exacerbation has resolved at this time.  Albuterol as needed.  Continue 

Trelegy.  Of note, patient is not on home oxygen and SPO2 on last ER visit was 

normal on room air.








(5) Gram-negative bacteremia


Is this a current diagnosis for this admission?: Yes   


Plan: 


Blood culture growing 2 different gram-negative rods in 1 blood culture bottle 

only. I question if this is a true bacteremia but being treated with ceftriaxone

nonetheless.  Identification came back as Moraxella and Citrobacter both 

sensitive to ceftriaxone.  Patient has completed 8 days of ceftriaxone.  Repeat 

blood cultures have been negative.  I do not think he needs any more 

antibiotics.








(6) Coronary artery disease


Qualifiers: 


   Coronary Disease-Associated Artery/Lesion type: native artery   Native vs. 

transplanted heart: native heart   Associated angina: without angina   Qualified

Code(s): I25.10 - Atherosclerotic heart disease of native coronary artery 

without angina pectoris   


Is this a current diagnosis for this admission?: Yes   


Plan: 


Continue aspirin and metoprolol and statin








(7) Hyperkalemia


Is this a current diagnosis for this admission?: Yes   


Plan: 


Currently resolved.  I strongly believe that his hyperkalemia was secondary to 

Remdesivir.  I have done review of medical literature which indicates some 

instances of hyperkalemia secondary to this medication.  We will check BMP again

in the morning.








(8) Hypertension


Qualifiers: 


   Hypertension type: essential hypertension   Qualified Code(s): I10 - 

Essential (primary) hypertension   


Is this a current diagnosis for this admission?: Yes   


Plan: 


Continue home meds








(9) Paroxysmal SVT (supraventricular tachycardia)


Is this a current diagnosis for this admission?: Yes   


Plan: 


Resolved at this point.  Continue beta-blocker.  Follow-up outpatient with 

cardiology.








- Time


Time Spent with patient: Less than 15 minutes


Anticipated Discharge Disposition: Home, Self Care


Anticipated Discharge Timeframe: within 24 hours

## 2020-08-04 VITALS — DIASTOLIC BLOOD PRESSURE: 62 MMHG | SYSTOLIC BLOOD PRESSURE: 106 MMHG

## 2020-08-04 LAB
ANION GAP SERPL CALC-SCNC: 5 MMOL/L (ref 5–19)
BUN SERPL-MCNC: 41 MG/DL (ref 7–20)
CALCIUM: 8.5 MG/DL (ref 8.4–10.2)
CHLORIDE SERPL-SCNC: 108 MMOL/L (ref 98–107)
CO2 SERPL-SCNC: 22 MMOL/L (ref 22–30)
ERYTHROCYTE [DISTWIDTH] IN BLOOD BY AUTOMATED COUNT: 13.6 % (ref 11.5–14)
GLUCOSE SERPL-MCNC: 116 MG/DL (ref 75–110)
HCT VFR BLD CALC: 41.5 % (ref 37.9–51)
HGB BLD-MCNC: 14.1 G/DL (ref 13.5–17)
MCH RBC QN AUTO: 32.7 PG (ref 27–33.4)
MCHC RBC AUTO-ENTMCNC: 33.9 G/DL (ref 32–36)
MCV RBC AUTO: 96 FL (ref 80–97)
PLATELET # BLD: 282 10^3/UL (ref 150–450)
POTASSIUM SERPL-SCNC: 4.9 MMOL/L (ref 3.6–5)
RBC # BLD AUTO: 4.31 10^6/UL (ref 4.35–5.55)
WBC # BLD AUTO: 9.8 10^3/UL (ref 4–10.5)

## 2020-08-04 RX ADMIN — FAMOTIDINE SCH MG: 20 TABLET, FILM COATED ORAL at 09:10

## 2020-08-04 RX ADMIN — DEXAMETHASONE SODIUM PHOSPHATE SCH MG: 4 INJECTION, SOLUTION INTRAMUSCULAR; INTRAVENOUS at 05:10

## 2020-08-04 RX ADMIN — Medication SCH MG: at 09:09

## 2020-08-04 RX ADMIN — VITAMIN D, TAB 1000IU (100/BT) SCH UNIT: 25 TAB at 09:09

## 2020-08-04 RX ADMIN — OXYCODONE HYDROCHLORIDE AND ACETAMINOPHEN SCH MG: 500 TABLET ORAL at 09:10

## 2020-08-04 RX ADMIN — ENOXAPARIN SODIUM SCH MG: 80 INJECTION SUBCUTANEOUS at 09:10

## 2020-08-04 RX ADMIN — DOCUSATE SODIUM SCH MG: 100 CAPSULE, LIQUID FILLED ORAL at 17:31

## 2020-08-04 RX ADMIN — ASPIRIN SCH MG: 81 TABLET, COATED ORAL at 09:10

## 2020-08-04 RX ADMIN — DOCUSATE SODIUM SCH MG: 100 CAPSULE, LIQUID FILLED ORAL at 09:09

## 2020-08-04 RX ADMIN — METOPROLOL SUCCINATE SCH MG: 50 TABLET, EXTENDED RELEASE ORAL at 09:10

## 2020-08-04 RX ADMIN — Medication SCH ML: at 13:30

## 2020-08-04 RX ADMIN — DEXAMETHASONE SODIUM PHOSPHATE SCH MG: 4 INJECTION, SOLUTION INTRAMUSCULAR; INTRAVENOUS at 13:29

## 2020-08-04 RX ADMIN — Medication SCH ML: at 05:10

## 2020-08-04 RX ADMIN — FLUTICASONE FUROATE, UMECLIDINIUM BROMIDE AND VILANTEROL TRIFENATATE SCH INHALER: 100; 62.5; 25 POWDER RESPIRATORY (INHALATION) at 09:12

## 2020-08-04 NOTE — PDOC DISCHARGE SUMMARY
Impression





- Admit/DC Date/PCP


Admission Date/Primary Care Provider: 


  07/26/20 20:18





  RAVEN PATEL MD





Discharge Date: 08/04/20





- Discharge Diagnosis


(1) Pneumonia due to COVID-19 virus


Is this a current diagnosis for this admission?: Yes   





(2) Acute respiratory failure with hypoxia


Is this a current diagnosis for this admission?: Yes   





(3) Acute metabolic encephalopathy


Is this a current diagnosis for this admission?: Yes   





(4) Acute infective exacerbation of chronic obstructive airway disease


Is this a current diagnosis for this admission?: Yes   





(5) Coronary artery disease


Is this a current diagnosis for this admission?: Yes   





(6) Gram-negative bacteremia


Is this a current diagnosis for this admission?: Yes   





(7) Hyperkalemia


Is this a current diagnosis for this admission?: Yes   





(8) Hypertension


Is this a current diagnosis for this admission?: Yes   





(9) Paroxysmal SVT (supraventricular tachycardia)


Is this a current diagnosis for this admission?: Yes   





- Additional Information


Resuscitation Status: Full Code


Discharge Diet: Cardiac


Discharge Activity: Activity As Tolerated


Referrals: 


RAVEN PATEL MD [Primary Care Provider] - Follow up as needed


Prescriptions: 


Prednisone [Deltasone 10 mg Tablet] 10 mg PO ASDIR PRN #21 tablet


 PRN Reason: 


Prednisone 10 mg PO ASDIR #1 tab.ds.pk


Albuterol Sulfate [Proair HFA Inhalation Aerosol 8.5 gm MDI] 2 puff IH Q6HP PRN 

#1 hfa.aer.ad


 PRN Reason: 


Home Medications: 








Metoprolol Succinate 25 mg PO DAILY 09/17/14 


Atorvastatin Calcium [Lipitor 40 mg Tablet] 40 mg PO QHS 09/28/17 


Aspirin [Ecotrin 81 mg EC Tablet] 81 mg PO DAILY 07/27/20 


Fluticasone/Umeclidin/Vilanter [Trelegy 100-62.5-25 Mcg Ellipta 14 Dose/Dpi] 1 

each IH DAILY 07/27/20 


Nitroglycerin [Nitrostat 0.4 mg (1/150 Gr) Tabs 25/Bottle] 1 tab SL Q5MP PRN 

07/27/20 


Albuterol Sulfate [Proair HFA Inhalation Aerosol 8.5 gm MDI] 2 puff IH Q6HP PRN 

#1 hfa.aer.ad 08/04/20 


Ascorbic Acid [Vitamin C 500 mg Tablet] 2,000 mg PO DAILY  tablet 08/04/20 


Cholecalciferol (Vitamin D3) [Vitamin D3 1000 Unit Tablet] 2,000 unit PO DAILY  

tablet 08/04/20 


Docusate Sodium [Colace 100 mg Capsule] 100 mg PO BID  capsule 08/04/20 


Guaifenesin [Robitussin Syrup 200 mg/10 ml Ud Cup] 200 mg PO Q4HP PRN  udc 

08/04/20 


Melatonin [Melatonin 5 mg Tablet] 10 mg PO QHS PRN  tablet 08/04/20 


Prednisone [Deltasone 10 mg Tablet] 10 mg PO ASDIR PRN #21 tablet 08/04/20 


Zinc Sulfate [Zinc-220 Capsule] 220 mg PO DAILY  capsule 08/04/20 


Prednisone 10 mg PO ASDIR #1 tab.ds.pk 08/05/20 











History of Present Illiness


History of Present Illness: 


CORAL ABAD is a 80 year old male who presented the emergency room with a 

two-week history of dyspnea.  Patient admits progressively worsening dyspnea 

over the course of the last 2 weeks becoming severe today.  His dyspnea has been

accompanied by a nonproductive cough and associated with a persistent subjective

fever.  His dyspnea is worsened by activity and exertion.  He tested positive 

for COVID-19 on 7/19/2020 and was being treated conservatively at home by his 

primary care provider.  He denies other associated or accompanying signs and 

symptoms.  He admits prior similar episodes related to his COPD.  He has not 

identified any additional aggravating or ameliorating factors for his dyspnea.  

In the emergency room he was found to be hypoxic requiring supplemental oxygen 

to maintain an adequate O2 saturation.  Chest x-ray showed bilateral pneumonia. 

Patient was subsequently admitted to the hospital for further evaluation and 

treatment on the COVID-19 unit.








Hospital Course


Hospital Course: 


(1) Pneumonia due to COVID-19 virus


Is this a current diagnosis for this admission?: Yes   


Plan: 


Presented with severe COVID Pneumonia but currently doing well.


Tested positive for COVID-19 on 7/16/2020.


Chest x-ray notable for bilateral pneumonia.  Currently afebrile.


He has completed 5 days of azithromycin and 5 days of Remdesivir. 


Dexamethasone day 9/10 given hypoxia.


On zinc, vitamin C, vitamin D supplements.


D-dimer is elevated so patient is on therapeutic dose Lovenox


Trending d-dimer, 


CRP & ESR are pretty much normal at this point.


Monitor CBC and electrolytes.  








(2) Acute respiratory failure with hypoxia


Is this a current diagnosis for this admission?: Yes   


Plan: 


Secondary to severe COVID-19 infection.  Today patient is down to 3.5 L nasal 

cannula.  This is the limiting factor currently for patient's discharge.


If patient is able to be weaned down to 2 L nasal cannula today and stay on 2 L 

for most of today, patient can be discharged tomorrow with home oxygen.








(3) Acute metabolic encephalopathy


Is this a current diagnosis for this admission?: Yes   


Plan: 


2/2 acute delirium given elderly age, acute severe sickness and being in a 

closed room on isolation for the past few days.


Remains resolved.  Continue supportive techniques and reorientation techniques. 

Keep window blinds open.








(4) Acute infective exacerbation of chronic obstructive airway disease


Is this a current diagnosis for this admission?: Yes   


Plan: 


COPD exacerbation has resolved at this time.  Albuterol as needed.  Continue 

Trelegy.  Of note, patient is not on home oxygen and SPO2 on last ER visit was 

normal on room air.








(5) Gram-negative bacteremia


Is this a current diagnosis for this admission?: Yes   


Plan: 


Blood culture growing 2 different gram-negative rods in 1 blood culture bottle 

only. I question if this is a true bacteremia but being treated with ceftriaxone

nonetheless.  Identification came back as Moraxella and Citrobacter both 

sensitive to ceftriaxone.  Patient has completed 8 days of ceftriaxone.  Repeat 

blood cultures have been negative.  I do not think he needs any more 

antibiotics.








(6) Coronary artery disease


Qualifiers: 


   Coronary Disease-Associated Artery/Lesion type: native artery   Native vs. 

transplanted heart: native heart   Associated angina: without angina   Qualified

Code(s): I25.10 - Atherosclerotic heart disease of native coronary artery 

without angina pectoris   


Is this a current diagnosis for this admission?: Yes   


Plan: 


Continue aspirin and metoprolol and statin








(7) Hyperkalemia


Is this a current diagnosis for this admission?: Yes   


Plan: 


Currently resolved.  I strongly believe that his hyperkalemia was secondary to 

Remdesivir.  I have done review of medical literature which indicates some 

instances of hyperkalemia secondary to this medication.  We will check BMP again

in the morning.








(8) Hypertension


Qualifiers: 


   Hypertension type: essential hypertension   Qualified Code(s): I10 - 

Essential (primary) hypertension   


Is this a current diagnosis for this admission?: Yes   


Plan: 


Continue home meds








(9) Paroxysmal SVT (supraventricular tachycardia)


Is this a current diagnosis for this admission?: Yes   


Plan: 


Resolved at this point.  Continue beta-blocker.  Follow-up outpatient with 

cardiology.





Physical Exam


Vital Signs: 


                                        











Temp Pulse Resp BP Pulse Ox


 


 97.4 F   75   21 H  119/55 L  86 L


 


 08/04/20 15:50  08/04/20 15:50  08/04/20 15:50  08/04/20 15:50  08/04/20 15:50








                                 Intake & Output











 08/03/20 08/04/20 08/05/20





 06:59 06:59 06:59


 


Intake Total 1337 475 325


 


Output Total 450 1150 725


 


Balance 110 -784 -400


 


Weight 79.2 kg 79.2 kg 79.2 kg











General appearance: PRESENT: no acute distress, cooperative, well-developed


Respiratory exam: PRESENT: clear to auscultation loly, symmetrical, unlabored.  

ABSENT: rales, rhonchi, tachypnea, wheezes


Cardiovascular exam: PRESENT: RRR, +S1, +S2.  ABSENT: bradycardia, diastolic 

murmur, irregular rhythm, systolic murmur, tachycardia


GI/Abdominal exam: PRESENT: normal bowel sounds, soft.  ABSENT: tenderness


Neurological exam: PRESENT: alert, awake, oriented to person, oriented to place,

oriented to time, oriented to situation, CN II-XII grossly intact, motor sensory

deficit.  ABSENT: altered





Results


Laboratory Results: 


                                        











WBC  9.8 10^3/uL (4.0-10.5)   08/04/20  06:46    


 


RBC  4.31 10^6/uL (4.35-5.55)  L  08/04/20  06:46    


 


Hgb  14.1 g/dL (13.5-17.0)   08/04/20  06:46    


 


Hct  41.5 % (37.9-51.0)   08/04/20  06:46    


 


MCV  96 fl (80-97)   08/04/20  06:46    


 


MCH  32.7 pg (27.0-33.4)   08/04/20  06:46    


 


MCHC  33.9 g/dL (32.0-36.0)   08/04/20  06:46    


 


RDW  13.6 % (11.5-14.0)   08/04/20  06:46    


 


Plt Count  282 10^3/uL (150-450)   08/04/20  06:46    


 


Lymph % (Auto)  6.8 % (13-45)  L  07/26/20  17:40    


 


Mono % (Auto)  10.6 % (3-13)   07/26/20  17:40    


 


Eos % (Auto)  1.5 % (0-6)   07/26/20  17:40    


 


Baso % (Auto)  0.2 % (0-2)   07/26/20  17:40    


 


Absolute Neuts (auto)  6.9 10^3/uL (1.7-8.2)   07/26/20  17:40    


 


Absolute Lymphs (auto)  0.6 10^3/uL (0.5-4.7)   07/26/20  17:40    


 


Absolute Monos (auto)  0.9 10^3/uL (0.1-1.4)   07/26/20  17:40    


 


Absolute Eos (auto)  0.1 10^3/uL (0.0-0.6)   07/26/20  17:40    


 


Absolute Basos (auto)  0.0 10^3/uL (0.0-0.2)   07/26/20  17:40    


 


Seg Neutrophils %  80.9 % (42-78)  H  07/26/20  17:40    


 


ESR  18 mm/hr (0-20)   08/02/20  06:25    


 


PT  14.5 SEC (11.4-15.4)   07/26/20  17:40    


 


INR  1.12   07/26/20  17:40    


 


APTT  33.3 SEC (23.5-35.8)   07/26/20  17:40    


 


Fibrinogen  865 mg/dL (209-497)  H  07/26/20  17:40    


 


D-Dimer  1.10 ug/mL (0.00-0.50)  H  08/04/20  06:46    


 


VBG pH  7.34  (7.30-7.42)   07/27/20  06:57    


 


VBG pCO2  40.0 mmHg (35-63)   07/27/20  06:57    


 


VBG HCO3  21.1 mmol/L (20-32)   07/27/20  06:57    


 


VBG Base Excess  -4.2 mmol/L  07/27/20  06:57    


 


Sodium  134.7 mmol/L (137-145)  L  08/04/20  06:46    


 


Potassium  4.9 mmol/L (3.6-5.0)   08/04/20  06:46    


 


Chloride  108 mmol/L ()  H  08/04/20  06:46    


 


Carbon Dioxide  22 mmol/L (22-30)   08/04/20  06:46    


 


Anion Gap  5  (5-19)   08/04/20  06:46    


 


BUN  41 mg/dL (7-20)  H  08/04/20  06:46    


 


Creatinine  1.18 mg/dL (0.52-1.25)   08/04/20  06:46    


 


Est GFR ( Amer)  > 60  (>60)   08/04/20  06:46    


 


Est GFR (MDRD) Non-Af  59  (>60)  L  08/04/20  06:46    


 


Glucose  116 mg/dL ()  H  08/04/20  06:46    


 


POC Glucose  121 mg/dL ()  H  07/31/20  16:33    


 


Calcium  8.5 mg/dL (8.4-10.2)   08/04/20  06:46    


 


Magnesium  2.7 mg/dL (1.6-2.3)  H  08/04/20  06:46    


 


Ferritin  1410.00 ng/mL (17.9-464.0)  H  07/28/20  05:24    


 


Total Bilirubin  1.0 mg/dL (0.2-1.3)   07/26/20  17:40    


 


Direct Bilirubin  0.2 mg/dL (0.0-0.4)   07/26/20  17:40    


 


Neonat Total Bilirubin  Not Reportable   07/26/20  17:40    


 


Neonat Direct Bilirubin  Not Reportable   07/26/20  17:40    


 


Neonat Indirect Bili  Not Reportable   07/26/20  17:40    


 


AST  53 U/L (17-59)   07/26/20  17:40    


 


ALT  49 U/L (<50)   07/26/20  17:40    


 


Alkaline Phosphatase  104 U/L ()   07/26/20  17:40    


 


Troponin I  < 0.012 ng/mL  07/31/20  17:28    


 


C-Reactive Protein  10.5 mg/L (<10.0)  H  08/02/20  06:25    


 


NT-Pro-B Natriuret Pep  180 pg/mL (<450)   07/26/20  17:40    


 


Total Protein  7.5 g/dL (6.3-8.2)   07/26/20  17:40    


 


Albumin  3.6 g/dL (3.5-5.0)   07/26/20  17:40    


 


Triglycerides  121 mg/dL (<150)   07/27/20  06:57    


 


Cholesterol  138.87 mg/dL (0-200)   07/27/20  06:57    


 


LDL Cholesterol Direct  72 mg/dL (<100)   07/27/20  06:57    


 


VLDL Cholesterol  24.0 mg/dL (10-31)   07/27/20  06:57    


 


HDL Cholesterol  32 mg/dL (>40)  L  07/27/20  06:57    


 


Interleukin 6  4.2 pg/mL (0.0-12.2)   07/28/20  05:24    


 


TSH  0.92 uIU/mL (0.47-4.68)   07/27/20  06:57    


 


Free T3 pg/mL  2.50 pg/mL (2.77-5.27)  L  07/26/20  17:40    


 


Urine Color  YELLOW   07/29/20  10:15    


 


Urine Appearance  CLEAR   07/29/20  10:15    


 


Urine pH  6.0  (5.0-9.0)   07/29/20  10:15    


 


Ur Specific Gravity  1.018   07/29/20  10:15    


 


Urine Protein  NEGATIVE mg/dL (NEGATIVE)   07/29/20  10:15    


 


Urine Glucose (UA)  NEGATIVE mg/dL (NEGATIVE)   07/29/20  10:15    


 


Urine Ketones  NEGATIVE mg/dL (NEGATIVE)   07/29/20  10:15    


 


Urine Blood  NEGATIVE  (NEGATIVE)   07/29/20  10:15    


 


Urine Nitrite  NEGATIVE  (NEGATIVE)   07/29/20  10:15    


 


Urine Nitrite (Reflex)  NEGATIVE  (NEGATIVE)   07/27/20  08:05    


 


Urine Bilirubin  NEGATIVE  (NEGATIVE)   07/29/20  10:15    


 


Urine Urobilinogen  NEGATIVE mg/dL (<2.0)   07/29/20  10:15    


 


Ur Leukocyte Esterase  NEGATIVE  (NEGATIVE)   07/29/20  10:15    


 


Leukocyte Esterase Rfl  NEGATIVE  (NEGATIVE)   07/27/20  08:05    


 


Urine WBC (Auto)  0 /HPF  07/29/20  10:15    


 


Urine RBC (Auto)  0 /HPF  07/29/20  10:15    


 


Urine WBC (Reflex)  < 1 /HPF  07/27/20  08:05    


 


Urine Mucus (Auto)  RARE /LPF  07/29/20  10:15    


 


Urine Ascorbic Acid  40  (NEGATIVE)  H  07/29/20  10:15    








                                        











  07/26/20 07/31/20





  17:40 17:28


 


Troponin I  < 0.012  < 0.012


 


NT-Pro-B Natriuret Pep  180 











Impressions: 


                                        





Chest X-Ray  07/26/20 18:17


IMPRESSION:  Worsening areas of consolidation in both lungs.


 








Chest X-Ray  08/02/20 06:00


IMPRESSION:  NO SIGNIFICANT INTERVAL CHANGE.


 














Plan


Health Concerns: 


Multiple comorbidities in an 80-year-old patient


Plan of Treatment: 


As above


Goals: 


Complete resolution of COVID infection


Time Spent: Greater than 30 Minutes





Stroke


Is this a Stroke Patient?: No





Acute Heart Failure





- **


Is this a Heart Failure Patient?: No